# Patient Record
Sex: MALE | Race: WHITE | NOT HISPANIC OR LATINO | Employment: OTHER | URBAN - METROPOLITAN AREA
[De-identification: names, ages, dates, MRNs, and addresses within clinical notes are randomized per-mention and may not be internally consistent; named-entity substitution may affect disease eponyms.]

---

## 2017-08-16 ENCOUNTER — ALLSCRIPTS OFFICE VISIT (OUTPATIENT)
Dept: OTHER | Facility: OTHER | Age: 67
End: 2017-08-16

## 2017-08-16 ENCOUNTER — APPOINTMENT (OUTPATIENT)
Dept: LAB | Facility: HOSPITAL | Age: 67
End: 2017-08-16
Attending: INTERNAL MEDICINE

## 2017-08-16 DIAGNOSIS — E03.9 HYPOTHYROIDISM: ICD-10-CM

## 2017-08-16 LAB
T4 FREE SERPL-MCNC: 0.97 NG/DL (ref 0.76–1.46)
TSH SERPL DL<=0.05 MIU/L-ACNC: 3.61 UIU/ML (ref 0.36–3.74)

## 2017-08-16 PROCEDURE — 86800 THYROGLOBULIN ANTIBODY: CPT

## 2017-08-16 PROCEDURE — 36415 COLL VENOUS BLD VENIPUNCTURE: CPT

## 2017-08-16 PROCEDURE — 84443 ASSAY THYROID STIM HORMONE: CPT

## 2017-08-16 PROCEDURE — 86376 MICROSOMAL ANTIBODY EACH: CPT

## 2017-08-16 PROCEDURE — 84439 ASSAY OF FREE THYROXINE: CPT

## 2017-08-17 ENCOUNTER — GENERIC CONVERSION - ENCOUNTER (OUTPATIENT)
Dept: OTHER | Facility: OTHER | Age: 67
End: 2017-08-17

## 2017-08-17 LAB
THYROGLOB AB SERPL-ACNC: <1 IU/ML (ref 0–0.9)
THYROPEROXIDASE AB SERPL-ACNC: 109 IU/ML (ref 0–34)

## 2017-08-21 ENCOUNTER — GENERIC CONVERSION - ENCOUNTER (OUTPATIENT)
Dept: OTHER | Facility: OTHER | Age: 67
End: 2017-08-21

## 2017-09-29 DIAGNOSIS — E03.9 HYPOTHYROIDISM: ICD-10-CM

## 2018-01-10 NOTE — RESULT NOTES
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 Methodology    Performed at:  [facility] [address]  [id number]  : Jamee Knight MD, Phone:  [phone]   [facility]  IU/mL H 0 - 34   Performed at:  [facility] [address]  [id number]  : Jamee Knight MD, Phone:  [phone]

## 2018-01-12 NOTE — RESULT NOTES
Discussion/Summary   His hypothyroidism is due to autoimmunity       Verified Results  (1) THYROID ANTIBODIES PANEL [id number] 05:10PM [employer] Order Number: [id number]     Test Name Result Flag Reference   THY MICROSOM  IU/mL H 0 - 34   Performed at:  [facility] [place] [address]  [id number]  : Terence Freire MD, Phone:  [phone]

## 2018-01-14 VITALS
BODY MASS INDEX: 24.56 KG/M2 | HEART RATE: 80 BPM | SYSTOLIC BLOOD PRESSURE: 120 MMHG | DIASTOLIC BLOOD PRESSURE: 80 MMHG | WEIGHT: 156.5 LBS | HEIGHT: 67 IN

## 2018-01-15 NOTE — RESULT NOTES
Discussion/Summary   Increase levothyroxine to 50 Âµg per day  Check TSH and free T4 in 6 weeks  Verified Results  (1) TSH [id number] 05:10PM [employer] Order Number: [id number]     Test Name Result Flag Reference   TSH 3 610 uIU/mL  0 358-3 740   Patients undergoing fluorescein dye angiography may retain small amounts of fluorescein in the body for 48-72 hours post procedure  Samples containing fluorescein can produce falsely depressed TSH values  If the patient had this procedure,a specimen should be resubmitted post fluorescein clearance  (1) T4, FREE [id number] 05:10PM [employer] Order Number: [id number]     Test Name Result Flag Reference   T4,FREE 0 97 ng/dL  0 76-1 46   Specimen collection should occur prior to Sulfasalazine administration due to the potential for falsely elevated results

## 2018-05-08 DIAGNOSIS — E03.9 HYPOTHYROIDISM, UNSPECIFIED TYPE: Primary | ICD-10-CM

## 2018-05-08 RX ORDER — LEVOTHYROXINE SODIUM 0.05 MG/1
1 TABLET ORAL DAILY
COMMUNITY
Start: 2017-12-20 | End: 2018-05-08 | Stop reason: SDUPTHER

## 2018-05-09 RX ORDER — LEVOTHYROXINE SODIUM 0.05 MG/1
50 TABLET ORAL DAILY
Qty: 30 TABLET | Refills: 0 | Status: SHIPPED | OUTPATIENT
Start: 2018-05-09 | End: 2018-06-05 | Stop reason: SDUPTHER

## 2018-06-05 DIAGNOSIS — E03.9 HYPOTHYROIDISM, UNSPECIFIED TYPE: ICD-10-CM

## 2018-06-05 RX ORDER — LEVOTHYROXINE SODIUM 0.05 MG/1
TABLET ORAL
Qty: 30 TABLET | Refills: 0 | Status: SHIPPED | OUTPATIENT
Start: 2018-06-05 | End: 2018-07-05 | Stop reason: CLARIF

## 2018-07-03 DIAGNOSIS — E03.9 HYPOTHYROIDISM, UNSPECIFIED TYPE: ICD-10-CM

## 2018-07-03 RX ORDER — LEVOTHYROXINE SODIUM 0.05 MG/1
TABLET ORAL
Qty: 30 TABLET | Refills: 0 | Status: CANCELLED | OUTPATIENT
Start: 2018-07-03

## 2018-07-05 DIAGNOSIS — E03.9 HYPOTHYROIDISM, UNSPECIFIED TYPE: ICD-10-CM

## 2018-07-05 RX ORDER — LEVOTHYROXINE SODIUM 0.05 MG/1
50 TABLET ORAL DAILY
Qty: 30 TABLET | Refills: 6 | Status: SHIPPED | OUTPATIENT
Start: 2018-07-05 | End: 2019-04-03 | Stop reason: SDUPTHER

## 2019-04-03 ENCOUNTER — OFFICE VISIT (OUTPATIENT)
Dept: ENDOCRINOLOGY | Facility: CLINIC | Age: 69
End: 2019-04-03
Payer: MEDICARE

## 2019-04-03 ENCOUNTER — APPOINTMENT (OUTPATIENT)
Dept: LAB | Facility: CLINIC | Age: 69
End: 2019-04-03
Payer: MEDICARE

## 2019-04-03 VITALS
DIASTOLIC BLOOD PRESSURE: 76 MMHG | HEIGHT: 67 IN | HEART RATE: 75 BPM | SYSTOLIC BLOOD PRESSURE: 118 MMHG | BODY MASS INDEX: 25.21 KG/M2 | WEIGHT: 160.6 LBS

## 2019-04-03 DIAGNOSIS — E03.9 HYPOTHYROIDISM, ADULT: Primary | ICD-10-CM

## 2019-04-03 LAB
T4 FREE SERPL-MCNC: 1.1 NG/DL (ref 0.76–1.46)
TSH SERPL DL<=0.05 MIU/L-ACNC: 3.01 UIU/ML (ref 0.36–3.74)

## 2019-04-03 PROCEDURE — 84443 ASSAY THYROID STIM HORMONE: CPT | Performed by: NURSE PRACTITIONER

## 2019-04-03 PROCEDURE — 36415 COLL VENOUS BLD VENIPUNCTURE: CPT | Performed by: NURSE PRACTITIONER

## 2019-04-03 PROCEDURE — 84439 ASSAY OF FREE THYROXINE: CPT | Performed by: NURSE PRACTITIONER

## 2019-04-03 PROCEDURE — 99214 OFFICE O/P EST MOD 30 MIN: CPT | Performed by: NURSE PRACTITIONER

## 2019-04-03 RX ORDER — LEVOTHYROXINE SODIUM 0.05 MG/1
50 TABLET ORAL DAILY
Qty: 30 TABLET | Refills: 1 | Status: SHIPPED | OUTPATIENT
Start: 2019-04-03 | End: 2019-05-22 | Stop reason: SDUPTHER

## 2019-04-03 RX ORDER — ATORVASTATIN CALCIUM 20 MG/1
20 TABLET, FILM COATED ORAL DAILY
Refills: 0 | COMMUNITY
Start: 2019-02-14

## 2019-04-03 RX ORDER — MELATONIN
1000 DAILY
COMMUNITY

## 2019-04-04 ENCOUNTER — TELEPHONE (OUTPATIENT)
Dept: ENDOCRINOLOGY | Facility: CLINIC | Age: 69
End: 2019-04-04

## 2019-05-22 DIAGNOSIS — E03.9 HYPOTHYROIDISM, ADULT: ICD-10-CM

## 2019-05-22 RX ORDER — LEVOTHYROXINE SODIUM 0.05 MG/1
50 TABLET ORAL DAILY
Qty: 30 TABLET | Refills: 1 | Status: SHIPPED | OUTPATIENT
Start: 2019-05-22 | End: 2019-07-17 | Stop reason: SDUPTHER

## 2019-07-17 DIAGNOSIS — E03.9 HYPOTHYROIDISM, ADULT: ICD-10-CM

## 2019-07-17 RX ORDER — LEVOTHYROXINE SODIUM 0.05 MG/1
50 TABLET ORAL DAILY
Qty: 30 TABLET | Refills: 1 | Status: SHIPPED | OUTPATIENT
Start: 2019-07-17 | End: 2019-08-13 | Stop reason: SDUPTHER

## 2019-08-13 DIAGNOSIS — E03.9 HYPOTHYROIDISM, ADULT: ICD-10-CM

## 2019-08-13 RX ORDER — LEVOTHYROXINE SODIUM 0.05 MG/1
TABLET ORAL
Qty: 90 TABLET | Refills: 3 | Status: SHIPPED | OUTPATIENT
Start: 2019-08-13 | End: 2020-07-24

## 2019-09-10 ENCOUNTER — OFFICE VISIT (OUTPATIENT)
Dept: URGENT CARE | Facility: CLINIC | Age: 69
End: 2019-09-10
Payer: MEDICARE

## 2019-09-10 VITALS
DIASTOLIC BLOOD PRESSURE: 83 MMHG | WEIGHT: 158 LBS | HEIGHT: 67 IN | SYSTOLIC BLOOD PRESSURE: 137 MMHG | RESPIRATION RATE: 16 BRPM | HEART RATE: 89 BPM | BODY MASS INDEX: 24.8 KG/M2 | TEMPERATURE: 97.6 F

## 2019-09-10 DIAGNOSIS — S81.002A OPEN KNEE WOUND, LEFT, INITIAL ENCOUNTER: Primary | ICD-10-CM

## 2019-09-10 PROCEDURE — G0463 HOSPITAL OUTPT CLINIC VISIT: HCPCS | Performed by: PHYSICIAN ASSISTANT

## 2019-09-10 PROCEDURE — 99213 OFFICE O/P EST LOW 20 MIN: CPT | Performed by: PHYSICIAN ASSISTANT

## 2019-09-10 RX ORDER — CEPHALEXIN 500 MG/1
500 CAPSULE ORAL EVERY 6 HOURS SCHEDULED
Qty: 28 CAPSULE | Refills: 0 | Status: SHIPPED | OUTPATIENT
Start: 2019-09-10 | End: 2019-09-17

## 2019-09-10 NOTE — PATIENT INSTRUCTIONS
Prescription for Keflex sent to the pharmacy-use as directed  Keep area clean and dry  Apply triple antibiotic ointment twice daily  Closely monitor for signs of worsening infection  Bacitracin ointment and dressing applied in office  Proceed to  ER if symptoms worsen

## 2019-09-10 NOTE — PROGRESS NOTES
[facility]        NAME: Hal Van is a 77 y o  male  : 1950    MRN: [id number]  DATE: September 10, 2019  TIME: 6:20 PM    Assessment and Plan   Open knee wound, left, initial encounter [S81 002A]  1  Open knee wound, left, initial encounter  cephalexin (KEFLEX) 500 mg capsule         Patient Instructions   Prescription for Keflex sent to the pharmacy-use as directed  Keep area clean and dry  Apply triple antibiotic ointment twice daily  Closely monitor for signs of worsening infection  Bacitracin ointment and dressing applied in office  Proceed to  ER if symptoms worsen  Chief Complaint     Chief Complaint   Patient presents with    Fall     3 days ago  abrasion on L knee  Cleaned and applied abx cream  Now knee is swollen and painful  states of some drainage  History of Present Illness   The patient is a 64-year-old male who presents with a wound of his left knee after a fall that occurred 3 days ago  He has noticed increased redness and swelling from the wound  He also states that there has been some drainage but he is unsure of what color  Negative fever or chills  Negative bony tenderness  He also has a small abrasion on his right knee that is healing well  He has been putting a topical medication from [place] on the wound but is unable to tell me what type of medication it is  HPI    Review of Systems   Review of Systems   Constitutional: Negative for chills and fever  Musculoskeletal: Positive for arthralgias and joint swelling  Skin: Positive for wound (Left knee)  All other systems reviewed and are negative          Current Medications       Current Outpatient Medications:     atorvastatin (LIPITOR) 20 mg tablet, Take 20 mg by mouth daily, Disp: , Rfl: 0    cholecalciferol (VITAMIN D3) 1,000 units tablet, Take 1,000 Units by mouth daily, Disp: , Rfl:     levothyroxine 50 mcg tablet, Take 1 pill daily, Disp: 90 tablet, Rfl: 3    cephalexin (KEFLEX) 500 mg capsule, Take 1 capsule (500 mg total) by mouth every 6 (six) hours for 7 days, Disp: 28 capsule, Rfl: 0    Current Allergies     Allergies as of 09/10/2019    (No Known Allergies)            The following portions of the patient's history were reviewed and updated as appropriate: allergies, current medications, past family history, past medical history, past social history, past surgical history and problem list      No past medical history on file  No past surgical history on file  Family History   Problem Relation Age of Onset    Thyroid disease unspecified Paternal Grandfather          Medications have been verified  Objective   /83 (Patient Position: Sitting)   Pulse 89   Temp 97 6 °F (36 4 °C) (Temporal)   Resp 16   Ht 5' 7" (1 702 m)   Wt 71 7 kg (158 lb)   BMI 24 75 kg/m²        Physical Exam     Physical Exam   Constitutional: He appears well-developed and well-nourished  No distress  HENT:   Head: Normocephalic and atraumatic  Musculoskeletal:        Legs:  Skin: Skin is warm and dry  Capillary refill takes less than 2 seconds  No rash noted  He is not diaphoretic  No erythema  No pallor  Psychiatric: He has a normal mood and affect  His behavior is normal    Nursing note and vitals reviewed

## 2020-07-23 DIAGNOSIS — E03.9 HYPOTHYROIDISM, ADULT: ICD-10-CM

## 2020-07-24 RX ORDER — LEVOTHYROXINE SODIUM 0.05 MG/1
TABLET ORAL
Qty: 90 TABLET | Refills: 3 | Status: SHIPPED | OUTPATIENT
Start: 2020-07-24

## 2021-02-13 DIAGNOSIS — Z23 ENCOUNTER FOR IMMUNIZATION: ICD-10-CM

## 2021-02-17 ENCOUNTER — IMMUNIZATIONS (OUTPATIENT)
Dept: FAMILY MEDICINE CLINIC | Facility: HOSPITAL | Age: 71
End: 2021-02-17

## 2021-02-17 DIAGNOSIS — Z23 ENCOUNTER FOR IMMUNIZATION: Primary | ICD-10-CM

## 2021-02-17 PROCEDURE — 91300 SARS-COV-2 / COVID-19 MRNA VACCINE (PFIZER-BIONTECH) 30 MCG: CPT

## 2021-02-17 PROCEDURE — 0001A SARS-COV-2 / COVID-19 MRNA VACCINE (PFIZER-BIONTECH) 30 MCG: CPT

## 2021-03-09 ENCOUNTER — IMMUNIZATIONS (OUTPATIENT)
Dept: FAMILY MEDICINE CLINIC | Facility: HOSPITAL | Age: 71
End: 2021-03-09

## 2021-03-09 DIAGNOSIS — Z23 ENCOUNTER FOR IMMUNIZATION: Primary | ICD-10-CM

## 2021-03-09 PROCEDURE — 91300 SARS-COV-2 / COVID-19 MRNA VACCINE (PFIZER-BIONTECH) 30 MCG: CPT

## 2021-03-09 PROCEDURE — 0002A SARS-COV-2 / COVID-19 MRNA VACCINE (PFIZER-BIONTECH) 30 MCG: CPT

## 2021-09-14 ENCOUNTER — IMMUNIZATIONS (OUTPATIENT)
Dept: FAMILY MEDICINE CLINIC | Facility: HOSPITAL | Age: 71
End: 2021-09-14

## 2021-09-14 DIAGNOSIS — Z23 ENCOUNTER FOR IMMUNIZATION: Primary | ICD-10-CM

## 2021-09-14 PROCEDURE — 91300 SARS-COV-2 / COVID-19 MRNA VACCINE (PFIZER-BIONTECH) 30 MCG: CPT

## 2021-09-14 PROCEDURE — 0001A SARS-COV-2 / COVID-19 MRNA VACCINE (PFIZER-BIONTECH) 30 MCG: CPT

## 2023-01-25 ENCOUNTER — HOSPITAL ENCOUNTER (OUTPATIENT)
Dept: NON INVASIVE DIAGNOSTICS | Facility: HOSPITAL | Age: 73
Discharge: HOME | End: 2023-01-25

## 2023-01-25 VITALS
HEART RATE: 88 BPM | BODY MASS INDEX: 24.8 KG/M2 | OXYGEN SATURATION: 96 % | DIASTOLIC BLOOD PRESSURE: 70 MMHG | HEIGHT: 67 IN | WEIGHT: 158 LBS | SYSTOLIC BLOOD PRESSURE: 110 MMHG | RESPIRATION RATE: 16 BRPM

## 2023-01-25 DIAGNOSIS — E78.5 HYPERLIPIDEMIA, UNSPECIFIED HYPERLIPIDEMIA TYPE: ICD-10-CM

## 2023-01-25 LAB
CHEST PAIN STATEMENT: NORMAL
MAX DIASTOLIC BP: 80 MMHG
MAX HEART RATE: 122 BPM
MAX HR PERCENT: 81 %
MAX HR: 120 BPM
MAX PREDICTED HEART RATE: 149 BPM
MAX. SYSTOLIC BP: 150 MMHG
PROTOCOL NAME: NORMAL
RATE PRESSURE PRODUCT: NORMAL
REASON FOR TERMINATION: NORMAL
SL CV STRESS RECOVERY BP: NORMAL MMHG
SL CV STRESS RECOVERY HR: 93 BPM
SL CV STRESS RECOVERY O2 SAT: 97 %
SL CV STRESS STAGE REACHED: 2
STRESS ANGINA INDEX: 0
STRESS BASELINE BP: NORMAL MMHG
STRESS BASELINE HR: 88 BPM
STRESS DUKE TREADMILL SCORE: 6
STRESS O2 SAT REST: 96 %
STRESS PEAK HR: 122 BPM
STRESS POST ESTIMATED WORKLOAD: 7 METS
STRESS POST EXERCISE DUR MIN: 5 MIN
STRESS POST EXERCISE DUR SEC: 59 SEC
STRESS POST O2 SAT PEAK: 97 %
STRESS POST PEAK BP: 150 MMHG
STRESS ST DEPRESSION: 0 MM
TARGET HR FORMULA: NORMAL
TEST INDICATION: NORMAL
TIME IN EXERCISE PHASE: NORMAL
TR MAX PG: 27 MMHG
TR PEAK VELOCITY: 2.6 M/S
TRICUSPID VALVE PEAK REGURGITATION VELOCITY: 2.61 M/S

## 2023-03-27 ENCOUNTER — ANESTHESIA (OUTPATIENT)
Dept: ANESTHESIOLOGY | Facility: HOSPITAL | Age: 73
End: 2023-03-27

## 2023-03-27 ENCOUNTER — CONSULT (OUTPATIENT)
Dept: PULMONOLOGY | Facility: CLINIC | Age: 73
End: 2023-03-27

## 2023-03-27 ENCOUNTER — ANESTHESIA EVENT (OUTPATIENT)
Dept: ANESTHESIOLOGY | Facility: HOSPITAL | Age: 73
End: 2023-03-27

## 2023-03-27 VITALS
TEMPERATURE: 96.4 F | BODY MASS INDEX: 25.18 KG/M2 | WEIGHT: 160.4 LBS | HEIGHT: 67 IN | DIASTOLIC BLOOD PRESSURE: 80 MMHG | RESPIRATION RATE: 18 BRPM | OXYGEN SATURATION: 96 % | HEART RATE: 89 BPM | SYSTOLIC BLOOD PRESSURE: 114 MMHG

## 2023-03-27 DIAGNOSIS — R91.8 LUNG MASS: ICD-10-CM

## 2023-03-27 DIAGNOSIS — R04.2 HEMOPTYSIS: ICD-10-CM

## 2023-03-27 DIAGNOSIS — D38.1 NEOPLASM OF UNCERTAIN BEHAVIOR OF LEFT UPPER LOBE OF LUNG: Primary | ICD-10-CM

## 2023-03-27 PROBLEM — J45.909 ASTHMA: Status: ACTIVE | Noted: 2023-03-27

## 2023-03-27 PROBLEM — E78.49 OTHER HYPERLIPIDEMIA: Status: ACTIVE | Noted: 2023-03-27

## 2023-03-27 RX ORDER — LEVOTHYROXINE SODIUM 0.07 MG/1
75 TABLET ORAL DAILY
COMMUNITY
Start: 2023-02-27

## 2023-03-27 RX ORDER — OXYCODONE HYDROCHLORIDE 5 MG/1
5 TABLET ORAL
COMMUNITY
Start: 2023-03-20

## 2023-03-27 RX ORDER — OXYCODONE HYDROCHLORIDE 5 MG/1
TABLET ORAL
COMMUNITY
Start: 2023-02-03

## 2023-03-27 RX ORDER — ATORVASTATIN CALCIUM 20 MG/1
TABLET, FILM COATED ORAL
COMMUNITY

## 2023-03-27 RX ORDER — CEPHALEXIN 500 MG/1
500 CAPSULE ORAL 2 TIMES DAILY
COMMUNITY
Start: 2023-03-18 | End: 2023-03-31

## 2023-03-27 NOTE — PROGRESS NOTES
Pulmonary Outpatient Consultation Note   Mellissa Cartagena 71 y o  male MRN: [id number]  3/27/2023    Referring provider: Dr Yesenia Rowley     Assessment/Plan:      Lung mass  Patient presents with a 2-month history of hemoptysis and findings of a left upper lobe/hilar mass, concerning for bronchogenic carcinoma  I have reviewed imaging with the patient and his brother  In order to confirm the diagnosis, he will need a biopsy, followed by staging in the form of a PET scan  Because of the central location of the tumor, biopsy via bronchoscopy is most prudent  We have made arrangements for bronchoscopy with endobronchial ultrasound to be performed tomorrow at [address] [place] [address]  I suspect he will have endobronchial tumor involvement given reports of hemoptysis  In the event we are unable to secure a diagnosis with tomorrow's procedure, could always consider navigation bronchoscopy if needed following week  He will also undergo PFTs, hoping that this is an early stage process and surgery would be an option  Once we have all of the information, I will plan to present at our multidisciplinary tumor board to discuss next steps  All of his and his brother's questions were answered to their satisfaction    Visit orders:    Diagnoses and all orders for this visit:    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  -     NM PET CT skull base to mid thigh; Future  -     Complete PFT with post bronchodilator; Future    Lung mass    Hemoptysis    Other orders  -     atorvastatin (LIPITOR) 20 mg tablet; Take by mouth  -     cephalexin (KEFLEX) 500 mg capsule; Take 500 mg by mouth 2 (two) times a day  -     levothyroxine 75 mcg tablet; Take 75 mcg by mouth daily (Patient not taking: Reported on 3/27/2023)  -     oxyCODONE (ROXICODONE) 5 immediate release tablet;  Take 5 mg by mouth every 4 to 6 hours as needed for pain (Patient not taking: Reported on 3/27/2023)  -     oxyCODONE (ROXICODONE) 5 immediate release tablet; Take 1 tablet every 4-6 hours for severe post operative pain  Do not take this medication with any other narcotics  Do not drive or operate heavy machinery while taking this medication  Do not drink alcohol while taking this medication  (Patient not taking: Reported on 3/27/2023)      History of Present Illness   HPI:  Desiree Quevedo is a 71 y o  male who is here today for evaluation regarding left hilar mass  He noted hemoptysis approximately 2 months ago  He reports intermittent episodes, with most recent episode a few days ago  For these concerns, he underwent CT of the chest which showed a left hilar mass  He is here today to discuss next steps  He is accompanied by his brother  Although the patient does not sense being short of breath, his brother feels that he seems more breathless when they are speaking over the phone  He denies shortness of breath while sitting at rest   He had asthma as a child, but no lung problems into adulthood  He does not use inhalers  He denies wheezing  He complains of fatigue  He also notes some vocal hoarseness over the past few months  He underwent left foot surgery/bunion repair in the recent past and is still in a left foot boot  He has some lower extremity edema on the left but denies calf tenderness  Patient smoked up to a pack per day for 30 years and quit a few months ago  Weight is stable  He denies fevers, chills or sweats  Review of Systems   Constitutional: Positive for fatigue  Negative for chills, fever and unexpected weight change  HENT: Positive for voice change  Negative for postnasal drip and sore throat  Eyes: Negative for visual disturbance  Respiratory:        As noted in HPI   Cardiovascular: Negative for chest pain  Gastrointestinal: Negative for abdominal pain, diarrhea and vomiting  Musculoskeletal: Negative for arthralgias  Skin: Negative for rash  Neurological: Negative for headaches  Hematological: Negative for adenopathy  Psychiatric/Behavioral: Negative  All other systems reviewed and are negative  Historical Information   Past Medical History:   Diagnosis Date   • Hyperlipidemia    • Hypothyroid      Past Surgical History:   Procedure Laterality Date   • BUNIONECTOMY Left      Family History   Problem Relation Age of Onset   • Lung cancer Mother    • Thyroid disease unspecified Paternal Grandfather        Occupational History: Retired  Previously worked as a [employer] jobs    Social History     Tobacco Use   Smoking Status Former   • Packs/day: 1 00   • Years: 30 00   • Pack years: 30 00   • Types: Cigarettes   • Quit date: 2023   • Years since quittin 2   Smokeless Tobacco Current       Meds/Allergies     Current Outpatient Medications:   •  atorvastatin (LIPITOR) 20 mg tablet, Take by mouth, Disp: , Rfl:   •  cephalexin (KEFLEX) 500 mg capsule, Take 500 mg by mouth 2 (two) times a day, Disp: , Rfl:   •  cholecalciferol (VITAMIN D3) 1,000 units tablet, Take 1,000 Units by mouth daily, Disp: , Rfl:   •  levothyroxine 50 mcg tablet, TAKE 1 TABLET BY MOUTH EVERY DAY, Disp: 90 tablet, Rfl: 3  •  atorvastatin (LIPITOR) 20 mg tablet, Take 20 mg by mouth daily (Patient not taking: Reported on 3/27/2023), Disp: , Rfl: 0  •  levothyroxine 75 mcg tablet, Take 75 mcg by mouth daily (Patient not taking: Reported on 3/27/2023), Disp: , Rfl:   •  oxyCODONE (ROXICODONE) 5 immediate release tablet, Take 5 mg by mouth every 4 to 6 hours as needed for pain (Patient not taking: Reported on 3/27/2023), Disp: , Rfl:   •  oxyCODONE (ROXICODONE) 5 immediate release tablet, Take 1 tablet every 4-6 hours for severe post operative pain  Do not take this medication with any other narcotics  Do not drive or operate heavy machinery while taking this medication  Do not drink alcohol while taking this medication   (Patient not taking: Reported on 3/27/2023), Disp: , Rfl:   No Known "Allergies    Vitals: Blood pressure 114/80, pulse 89, temperature (!) 96 4 °F (35 8 °C), resp  rate 18, height 5' 7\" (1 702 m), weight 72 8 kg (160 lb 6 4 oz), SpO2 96 %  , Body mass index is 25 12 kg/m²  Oxygen Therapy  SpO2: 96 %  Oxygen Therapy: None (Room air)    Physical Exam   Physical Exam  Constitutional:       General: He is not in acute distress  HENT:      Head: Normocephalic  Mouth/Throat:      Pharynx: No oropharyngeal exudate  Eyes:      General: No scleral icterus  Neck:      Vascular: No JVD  Cardiovascular:      Rate and Rhythm: Normal rate and regular rhythm  Pulmonary:      Breath sounds: No wheezing, rhonchi or rales  Abdominal:      Palpations: Abdomen is soft  Tenderness: There is no abdominal tenderness  Musculoskeletal:      Cervical back: Neck supple  Left lower leg: Edema present  Comments: Left foot boot in place, no calf tenderness   Lymphadenopathy:      Cervical: No cervical adenopathy  Skin:     General: Skin is warm and dry  Neurological:      Mental Status: He is alert and oriented to person, place, and time  Psychiatric:         Mood and Affect: Mood normal        Imaging and other studies: I have personally reviewed pertinent reports  and I have personally reviewed pertinent films in PACS  Chest CT 3/21/23 - 3 3 x 2 5 cm MARISOL/hilar mass / LAD - Left hilum 9 mm, right hilum 1 4 cm, left paratracheal 9 mm    Labs performed on 1/27/2023 show normal CBC and platelet count    LFTs normal     "

## 2023-03-27 NOTE — ANESTHESIA PREPROCEDURE EVALUATION
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      Other   (+) Hemoptysis   (+) Lung mass      Exercise Stress 1/25/2023:  •  Stress ECG: The patient reached stage 2 0 of the protocol after exercising for 5 min and 59 sec and had a maximal HR of 120 bpm (81 % of MPHR) and 7 0 METS  The patient experienced no angina during the test   Test stopped due to limiting dyspnea and foot pain  •  Stress ECG: No ST deviation is noted  Arrhythmias during stress: occasional PVCs  The stress ECG is negative for ischemia after maximal exercise at 81% of a predicted maximal heart rate, without reproduction of symptoms  Strauss treadmill score is 6, low risk  •  Post Stress Echo: Left ventricle cavity has normal reduction in size post-stress  The left ventricle systolic function is normal post-stress  •  Echo Post Impression: No EKG or echo evidence of stress-induced ischemia at 81% of age-predicted maximal heart rate  Test sensitivity is slightly lowered due to failure to achieve target heart rate      Low risk exercise stress echo with no EKG or echo evidence of stress-induced ischemia at 81% of age-predicted maximal heart rate  No results found for: WBC, HGB, HCT, MCV, PLT  No results found for: SODIUM, K, CL, CO2, BUN, CREATININE, GLUC, CALCIUM  No results found for: INR, PROTIME  Lab Results   Component Value Date    HGBA1C 6 1 (H) 01/27/2023               Anesthesia Plan  ASA Score- 3     Anesthesia Type- general with ASA Monitors  Additional Monitors:   Airway Plan: LMA  Plan Factors-    Chart reviewed  EKG reviewed  Existing labs reviewed  Patient summary reviewed  Induction- intravenous      Postoperative Plan-     Informed Consent-

## 2023-03-27 NOTE — ASSESSMENT & PLAN NOTE
Patient presents with a 2-month history of hemoptysis and findings of a left upper lobe/hilar mass, concerning for bronchogenic carcinoma  I have reviewed imaging with the patient and his brother  In order to confirm the diagnosis, he will need a biopsy, followed by staging in the form of a PET scan  Because of the central location of the tumor, biopsy via bronchoscopy is most prudent  We have made arrangements for bronchoscopy with endobronchial ultrasound to be performed tomorrow at [address] [place] [address]  I suspect he will have endobronchial tumor involvement given reports of hemoptysis  In the event we are unable to secure a diagnosis with tomorrow's procedure, could always consider navigation bronchoscopy if needed following week  He will also undergo PFTs, hoping that this is an early stage process and surgery would be an option  Once we have all of the information, I will plan to present at our multidisciplinary tumor board to discuss next steps

## 2023-03-27 NOTE — LETTER
March 27, 2023     Crystal Da Silva DO  [address] [address]    Patient: Shantal Pompa   YOB: 1950   Date of Visit: 3/27/2023       Dear Dr Mihir Beckford: Thank you for referring Alfa Coombs to me for evaluation  Below are my notes for this consultation  If you have questions, please do not hesitate to call me  I look forward to following your patient along with you  Sincerely,        Bernice Rondon DO        CC: No Recipients  Bernice Rondon DO  3/27/2023  9:57 AM  Addendum  Pulmonary Outpatient Consultation Note   Shantal Pompa 71 y o  male MRN: [id number]  3/27/2023    Referring provider: Dr Luis Doherty     Assessment/Plan:      Lung mass  Patient presents with a 2-month history of hemoptysis and findings of a left upper lobe/hilar mass, concerning for bronchogenic carcinoma  I have reviewed imaging with the patient and his brother  In order to confirm the diagnosis, he will need a biopsy, followed by staging in the form of a PET scan  Because of the central location of the tumor, biopsy via bronchoscopy is most prudent  We have made arrangements for bronchoscopy with endobronchial ultrasound to be performed tomorrow at [address] [place] [address]  I suspect he will have endobronchial tumor involvement given reports of hemoptysis  In the event we are unable to secure a diagnosis with tomorrow's procedure, could always consider navigation bronchoscopy if needed following week  He will also undergo PFTs, hoping that this is an early stage process and surgery would be an option  Once we have all of the information, I will plan to present at our multidisciplinary tumor board to discuss next steps  All of his and his brother's questions were answered to their satisfaction    Visit orders:    Diagnoses and all orders for this visit:    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  -     NM PET CT skull base to mid thigh;  Future  - Complete PFT with post bronchodilator; Future    Lung mass    Hemoptysis    Other orders  -     atorvastatin (LIPITOR) 20 mg tablet; Take by mouth  -     cephalexin (KEFLEX) 500 mg capsule; Take 500 mg by mouth 2 (two) times a day  -     levothyroxine 75 mcg tablet; Take 75 mcg by mouth daily (Patient not taking: Reported on 3/27/2023)  -     oxyCODONE (ROXICODONE) 5 immediate release tablet; Take 5 mg by mouth every 4 to 6 hours as needed for pain (Patient not taking: Reported on 3/27/2023)  -     oxyCODONE (ROXICODONE) 5 immediate release tablet; Take 1 tablet every 4-6 hours for severe post operative pain  Do not take this medication with any other narcotics  Do not drive or operate heavy machinery while taking this medication  Do not drink alcohol while taking this medication  (Patient not taking: Reported on 3/27/2023)      History of Present Illness   HPI:  Bharti Rich is a 71 y o  male who is here today for evaluation regarding left hilar mass  He noted hemoptysis approximately 2 months ago  He reports intermittent episodes, with most recent episode a few days ago  For these concerns, he underwent CT of the chest which showed a left hilar mass  He is here today to discuss next steps  He is accompanied by his brother  Although the patient does not sense being short of breath, his brother feels that he seems more breathless when they are speaking over the phone  He denies shortness of breath while sitting at rest   He had asthma as a child, but no lung problems into adulthood  He does not use inhalers  He denies wheezing  He complains of fatigue  He also notes some vocal hoarseness over the past few months  He underwent left foot surgery/bunion repair in the recent past and is still in a left foot boot  He has some lower extremity edema on the left but denies calf tenderness  Patient smoked up to a pack per day for 30 years and quit a few months ago  Weight is stable    He denies fevers, chills or sweats  Review of Systems   Constitutional: Positive for fatigue  Negative for chills, fever and unexpected weight change  HENT: Positive for voice change  Negative for postnasal drip and sore throat  Eyes: Negative for visual disturbance  Respiratory:        As noted in HPI   Cardiovascular: Negative for chest pain  Gastrointestinal: Negative for abdominal pain, diarrhea and vomiting  Musculoskeletal: Negative for arthralgias  Skin: Negative for rash  Neurological: Negative for headaches  Hematological: Negative for adenopathy  Psychiatric/Behavioral: Negative  All other systems reviewed and are negative  Historical Information   Past Medical History:   Diagnosis Date   • Hyperlipidemia    • Hypothyroid      Past Surgical History:   Procedure Laterality Date   • BUNIONECTOMY Left      Family History   Problem Relation Age of Onset   • Lung cancer Mother    • Thyroid disease unspecified Paternal Grandfather        Occupational History: Retired    Previously worked as a [employer] and [employer] jobs    Social History     Tobacco Use   Smoking Status Former   • Packs/day:    • Years:    • Pack years:    • Types: Cigarettes   • Quit date: 2023   • Years since quittin 2   Smokeless Tobacco Current       Meds/Allergies      Current Outpatient Medications:   •  atorvastatin (LIPITOR) 20 mg tablet, Take by mouth, Disp: , Rfl:   •  cephalexin (KEFLEX) 500 mg capsule, Take 500 mg by mouth 2 (two) times a day, Disp: , Rfl:   •  cholecalciferol (VITAMIN D3) 1,000 units tablet, Take 1,000 Units by mouth daily, Disp: , Rfl:   •  levothyroxine 50 mcg tablet, TAKE 1 TABLET BY MOUTH EVERY DAY, Disp: 90 tablet, Rfl: 3  •  atorvastatin (LIPITOR) 20 mg tablet, Take 20 mg by mouth daily (Patient not taking: Reported on 3/27/2023), Disp: , Rfl: 0  •  levothyroxine 75 mcg tablet, Take 75 mcg by mouth daily (Patient not taking: Reported on 3/27/2023), Disp: , Rfl:   • "oxyCODONE (ROXICODONE) 5 immediate release tablet, Take 5 mg by mouth every 4 to 6 hours as needed for pain (Patient not taking: Reported on 3/27/2023), Disp: , Rfl:   •  oxyCODONE (ROXICODONE) 5 immediate release tablet, Take 1 tablet every 4-6 hours for severe post operative pain  Do not take this medication with any other narcotics  Do not drive or operate heavy machinery while taking this medication  Do not drink alcohol while taking this medication  (Patient not taking: Reported on 3/27/2023), Disp: , Rfl:   No Known Allergies    Vitals: Blood pressure 114/80, pulse 89, temperature (!) 96 4 °F (35 8 °C), resp  rate 18, height 5' 7\" (1 702 m), weight 72 8 kg (160 lb 6 4 oz), SpO2 96 %  , Body mass index is 25 12 kg/m²  Oxygen Therapy  SpO2: 96 %  Oxygen Therapy: None (Room air)    Physical Exam   Physical Exam  Constitutional:       General: He is not in acute distress  HENT:      Head: Normocephalic  Mouth/Throat:      Pharynx: No oropharyngeal exudate  Eyes:      General: No scleral icterus  Neck:      Vascular: No JVD  Cardiovascular:      Rate and Rhythm: Normal rate and regular rhythm  Abdominal:      Palpations: Abdomen is soft  Tenderness: There is no abdominal tenderness  Musculoskeletal:      Cervical back: Neck supple  Left lower leg: Edema present  Comments: Left foot boot in place, no calf tenderness   Lymphadenopathy:      Cervical: No cervical adenopathy  Skin:     General: Skin is warm and dry  Neurological:      Mental Status: He is alert and oriented to person, place, and time  Psychiatric:         Mood and Affect: Mood normal        Imaging and other studies: I have personally reviewed pertinent reports  and I have personally reviewed pertinent films in PACS  Chest CT 3/21/23 - 3 3 x 2 5 cm MARISOL/hilar mass / LAD - Left hilum 9 mm, right hilum 1 4 cm, left paratracheal 9 mm    Labs performed on 1/27/2023 show normal CBC and platelet count    LFTs " normal

## 2023-03-27 NOTE — H&P (VIEW-ONLY)
Pulmonary Outpatient Consultation Note   Iraida Bennett 71 y o  male MRN: [id number]  3/27/2023    Referring provider: Dr Grecia Del Valle     Assessment/Plan:      Lung mass  Patient presents with a 2-month history of hemoptysis and findings of a left upper lobe/hilar mass, concerning for bronchogenic carcinoma  I have reviewed imaging with the patient and his brother  In order to confirm the diagnosis, he will need a biopsy, followed by staging in the form of a PET scan  Because of the central location of the tumor, biopsy via bronchoscopy is most prudent  We have made arrangements for bronchoscopy with endobronchial ultrasound to be performed tomorrow at [address] [place] [address]  I suspect he will have endobronchial tumor involvement given reports of hemoptysis  In the event we are unable to secure a diagnosis with tomorrow's procedure, could always consider navigation bronchoscopy if needed following week  He will also undergo PFTs, hoping that this is an early stage process and surgery would be an option  Once we have all of the information, I will plan to present at our multidisciplinary tumor board to discuss next steps  All of his and his brother's questions were answered to their satisfaction    Visit orders:    Diagnoses and all orders for this visit:    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  -     NM PET CT skull base to mid thigh; Future  -     Complete PFT with post bronchodilator; Future    Lung mass    Hemoptysis    Other orders  -     atorvastatin (LIPITOR) 20 mg tablet; Take by mouth  -     cephalexin (KEFLEX) 500 mg capsule; Take 500 mg by mouth 2 (two) times a day  -     levothyroxine 75 mcg tablet; Take 75 mcg by mouth daily (Patient not taking: Reported on 3/27/2023)  -     oxyCODONE (ROXICODONE) 5 immediate release tablet;  Take 5 mg by mouth every 4 to 6 hours as needed for pain (Patient not taking: Reported on 3/27/2023)  -     oxyCODONE (ROXICODONE) 5 immediate release tablet; Take 1 tablet every 4-6 hours for severe post operative pain  Do not take this medication with any other narcotics  Do not drive or operate heavy machinery while taking this medication  Do not drink alcohol while taking this medication  (Patient not taking: Reported on 3/27/2023)      History of Present Illness   HPI:  Marli Hernandez is a 71 y o  male who is here today for evaluation regarding left hilar mass  He noted hemoptysis approximately 2 months ago  He reports intermittent episodes, with most recent episode a few days ago  For these concerns, he underwent CT of the chest which showed a left hilar mass  He is here today to discuss next steps  He is accompanied by his brother  Although the patient does not sense being short of breath, his brother feels that he seems more breathless when they are speaking over the phone  He denies shortness of breath while sitting at rest   He had asthma as a child, but no lung problems into adulthood  He does not use inhalers  He denies wheezing  He complains of fatigue  He also notes some vocal hoarseness over the past few months  He underwent left foot surgery/bunion repair in the recent past and is still in a left foot boot  He has some lower extremity edema on the left but denies calf tenderness  Patient smoked up to a pack per day for 30 years and quit a few months ago  Weight is stable  He denies fevers, chills or sweats  Review of Systems   Constitutional: Positive for fatigue  Negative for chills, fever and unexpected weight change  HENT: Positive for voice change  Negative for postnasal drip and sore throat  Eyes: Negative for visual disturbance  Respiratory:        As noted in HPI   Cardiovascular: Negative for chest pain  Gastrointestinal: Negative for abdominal pain, diarrhea and vomiting  Musculoskeletal: Negative for arthralgias  Skin: Negative for rash  Neurological: Negative for headaches  Hematological: Negative for adenopathy  Psychiatric/Behavioral: Negative  All other systems reviewed and are negative  Historical Information   Past Medical History:   Diagnosis Date   • Hyperlipidemia    • Hypothyroid      Past Surgical History:   Procedure Laterality Date   • BUNIONECTOMY Left      Family History   Problem Relation Age of Onset   • Lung cancer Mother    • Thyroid disease unspecified Paternal Grandfather        Occupational History: Retired  Previously worked as a [employer] and [employer] jobs    Social History     Tobacco Use   Smoking Status Former   • Packs/day: 1 00   • Years: 30 00   • Pack years: 30 00   • Types: Cigarettes   • Quit date: 2023   • Years since quittin 2   Smokeless Tobacco Current       Meds/Allergies     Current Outpatient Medications:   •  atorvastatin (LIPITOR) 20 mg tablet, Take by mouth, Disp: , Rfl:   •  cephalexin (KEFLEX) 500 mg capsule, Take 500 mg by mouth 2 (two) times a day, Disp: , Rfl:   •  cholecalciferol (VITAMIN D3) 1,000 units tablet, Take 1,000 Units by mouth daily, Disp: , Rfl:   •  levothyroxine 50 mcg tablet, TAKE 1 TABLET BY MOUTH EVERY DAY, Disp: 90 tablet, Rfl: 3  •  atorvastatin (LIPITOR) 20 mg tablet, Take 20 mg by mouth daily (Patient not taking: Reported on 3/27/2023), Disp: , Rfl: 0  •  levothyroxine 75 mcg tablet, Take 75 mcg by mouth daily (Patient not taking: Reported on 3/27/2023), Disp: , Rfl:   •  oxyCODONE (ROXICODONE) 5 immediate release tablet, Take 5 mg by mouth every 4 to 6 hours as needed for pain (Patient not taking: Reported on 3/27/2023), Disp: , Rfl:   •  oxyCODONE (ROXICODONE) 5 immediate release tablet, Take 1 tablet every 4-6 hours for severe post operative pain  Do not take this medication with any other narcotics  Do not drive or operate heavy machinery while taking this medication  Do not drink alcohol while taking this medication   (Patient not taking: Reported on 3/27/2023), Disp: , Rfl:   No Known "Allergies    Vitals: Blood pressure 114/80, pulse 89, temperature (!) 96 4 °F (35 8 °C), resp  rate 18, height 5' 7\" (1 702 m), weight 72 8 kg (160 lb 6 4 oz), SpO2 96 %  , Body mass index is 25 12 kg/m²  Oxygen Therapy  SpO2: 96 %  Oxygen Therapy: None (Room air)    Physical Exam   Physical Exam  Constitutional:       General: He is not in acute distress  HENT:      Head: Normocephalic  Mouth/Throat:      Pharynx: No oropharyngeal exudate  Eyes:      General: No scleral icterus  Neck:      Vascular: No JVD  Cardiovascular:      Rate and Rhythm: Normal rate and regular rhythm  Pulmonary:      Breath sounds: No wheezing, rhonchi or rales  Abdominal:      Palpations: Abdomen is soft  Tenderness: There is no abdominal tenderness  Musculoskeletal:      Cervical back: Neck supple  Left lower leg: Edema present  Comments: Left foot boot in place, no calf tenderness   Lymphadenopathy:      Cervical: No cervical adenopathy  Skin:     General: Skin is warm and dry  Neurological:      Mental Status: He is alert and oriented to person, place, and time  Psychiatric:         Mood and Affect: Mood normal        Imaging and other studies: I have personally reviewed pertinent reports  and I have personally reviewed pertinent films in PACS  Chest CT 3/21/23 - 3 3 x 2 5 cm MARISOL/hilar mass / LAD - Left hilum 9 mm, right hilum 1 4 cm, left paratracheal 9 mm    Labs performed on 1/27/2023 show normal CBC and platelet count    LFTs normal     "

## 2023-03-28 ENCOUNTER — PREP FOR PROCEDURE (OUTPATIENT)
Dept: PULMONOLOGY | Facility: CLINIC | Age: 73
End: 2023-03-28

## 2023-03-28 ENCOUNTER — HOSPITAL ENCOUNTER (OUTPATIENT)
Dept: GASTROENTEROLOGY | Facility: HOSPITAL | Age: 73
Setting detail: OUTPATIENT SURGERY
Discharge: HOME | End: 2023-03-28
Attending: INTERNAL MEDICINE

## 2023-03-28 ENCOUNTER — ANESTHESIA EVENT (OUTPATIENT)
Dept: GASTROENTEROLOGY | Facility: HOSPITAL | Age: 73
End: 2023-03-28

## 2023-03-28 ENCOUNTER — ANESTHESIA (OUTPATIENT)
Dept: GASTROENTEROLOGY | Facility: HOSPITAL | Age: 73
End: 2023-03-28

## 2023-03-28 VITALS
RESPIRATION RATE: 18 BRPM | HEIGHT: 67 IN | DIASTOLIC BLOOD PRESSURE: 62 MMHG | OXYGEN SATURATION: 94 % | HEART RATE: 82 BPM | TEMPERATURE: 97 F | SYSTOLIC BLOOD PRESSURE: 110 MMHG | WEIGHT: 160 LBS | BODY MASS INDEX: 25.11 KG/M2

## 2023-03-28 DIAGNOSIS — R91.8 LUNG MASS: Primary | ICD-10-CM

## 2023-03-28 DIAGNOSIS — R91.8 LUNG MASS: ICD-10-CM

## 2023-03-28 LAB
EOSINOPHIL NFR FLD MANUAL: 2 %
LYMPHOCYTES NFR BLD AUTO: 15 %
NEUTS SEG NFR BLD AUTO: 83 %
TOTAL CELLS COUNTED SPEC: 100

## 2023-03-28 RX ORDER — PROPOFOL 10 MG/ML
INJECTION, EMULSION INTRAVENOUS CONTINUOUS PRN
Status: DISCONTINUED | OUTPATIENT
Start: 2023-03-28 | End: 2023-03-28

## 2023-03-28 RX ORDER — EPHEDRINE SULFATE 50 MG/ML
INJECTION INTRAVENOUS AS NEEDED
Status: DISCONTINUED | OUTPATIENT
Start: 2023-03-28 | End: 2023-03-28

## 2023-03-28 RX ORDER — LIDOCAINE HYDROCHLORIDE 20 MG/ML
INJECTION, SOLUTION EPIDURAL; INFILTRATION; INTRACAUDAL; PERINEURAL AS NEEDED
Status: DISCONTINUED | OUTPATIENT
Start: 2023-03-28 | End: 2023-03-28

## 2023-03-28 RX ORDER — FENTANYL CITRATE/PF 50 MCG/ML
25 SYRINGE (ML) INJECTION
Status: DISCONTINUED | OUTPATIENT
Start: 2023-03-28 | End: 2023-03-28 | Stop reason: HOSPADM

## 2023-03-28 RX ORDER — PHENYLEPHRINE HYDROCHLORIDE 10 MG/ML
INJECTION INTRAVENOUS AS NEEDED
Status: DISCONTINUED | OUTPATIENT
Start: 2023-03-28 | End: 2023-03-28

## 2023-03-28 RX ORDER — DEXAMETHASONE SODIUM PHOSPHATE 10 MG/ML
INJECTION, SOLUTION INTRAMUSCULAR; INTRAVENOUS AS NEEDED
Status: DISCONTINUED | OUTPATIENT
Start: 2023-03-28 | End: 2023-03-28

## 2023-03-28 RX ORDER — PROPOFOL 10 MG/ML
INJECTION, EMULSION INTRAVENOUS AS NEEDED
Status: DISCONTINUED | OUTPATIENT
Start: 2023-03-28 | End: 2023-03-28

## 2023-03-28 RX ORDER — FENTANYL CITRATE 50 UG/ML
INJECTION, SOLUTION INTRAMUSCULAR; INTRAVENOUS AS NEEDED
Status: DISCONTINUED | OUTPATIENT
Start: 2023-03-28 | End: 2023-03-28

## 2023-03-28 RX ORDER — MIDAZOLAM HYDROCHLORIDE 2 MG/2ML
INJECTION, SOLUTION INTRAMUSCULAR; INTRAVENOUS AS NEEDED
Status: DISCONTINUED | OUTPATIENT
Start: 2023-03-28 | End: 2023-03-28

## 2023-03-28 RX ORDER — ALBUTEROL SULFATE 2.5 MG/3ML
2.5 SOLUTION RESPIRATORY (INHALATION) ONCE
Status: CANCELLED | OUTPATIENT
Start: 2023-03-28

## 2023-03-28 RX ORDER — SODIUM CHLORIDE, SODIUM LACTATE, POTASSIUM CHLORIDE, CALCIUM CHLORIDE 600; 310; 30; 20 MG/100ML; MG/100ML; MG/100ML; MG/100ML
INJECTION, SOLUTION INTRAVENOUS CONTINUOUS PRN
Status: DISCONTINUED | OUTPATIENT
Start: 2023-03-28 | End: 2023-03-28

## 2023-03-28 RX ADMIN — PROPOFOL 150 MG: 10 INJECTION, EMULSION INTRAVENOUS at 07:38

## 2023-03-28 RX ADMIN — EPHEDRINE SULFATE 10 MG: 50 INJECTION, SOLUTION INTRAVENOUS at 08:49

## 2023-03-28 RX ADMIN — LIDOCAINE HYDROCHLORIDE 80 MG: 20 INJECTION, SOLUTION EPIDURAL; INFILTRATION; INTRACAUDAL; PERINEURAL at 07:38

## 2023-03-28 RX ADMIN — SODIUM CHLORIDE, SODIUM LACTATE, POTASSIUM CHLORIDE, AND CALCIUM CHLORIDE: .6; .31; .03; .02 INJECTION, SOLUTION INTRAVENOUS at 07:34

## 2023-03-28 RX ADMIN — MIDAZOLAM 2 MG: 1 INJECTION INTRAMUSCULAR; INTRAVENOUS at 07:34

## 2023-03-28 RX ADMIN — PHENYLEPHRINE HYDROCHLORIDE 200 MCG: 10 INJECTION INTRAVENOUS at 08:36

## 2023-03-28 RX ADMIN — PHENYLEPHRINE HYDROCHLORIDE 200 MCG: 10 INJECTION INTRAVENOUS at 08:29

## 2023-03-28 RX ADMIN — PHENYLEPHRINE HYDROCHLORIDE 200 MCG: 10 INJECTION INTRAVENOUS at 08:22

## 2023-03-28 RX ADMIN — PHENYLEPHRINE HYDROCHLORIDE 200 MCG: 10 INJECTION INTRAVENOUS at 08:49

## 2023-03-28 RX ADMIN — DEXAMETHASONE SODIUM PHOSPHATE 10 MG: 10 INJECTION, SOLUTION INTRAMUSCULAR; INTRAVENOUS at 07:38

## 2023-03-28 RX ADMIN — EPHEDRINE SULFATE 10 MG: 50 INJECTION, SOLUTION INTRAVENOUS at 08:07

## 2023-03-28 RX ADMIN — FENTANYL CITRATE 50 MCG: 50 INJECTION INTRAMUSCULAR; INTRAVENOUS at 07:34

## 2023-03-28 RX ADMIN — PHENYLEPHRINE HYDROCHLORIDE 200 MCG: 10 INJECTION INTRAVENOUS at 07:48

## 2023-03-28 RX ADMIN — REMIFENTANIL HYDROCHLORIDE 0.02 MCG/KG/MIN: 1 INJECTION, POWDER, LYOPHILIZED, FOR SOLUTION INTRAVENOUS at 07:41

## 2023-03-28 RX ADMIN — PHENYLEPHRINE HYDROCHLORIDE 200 MCG: 10 INJECTION INTRAVENOUS at 07:56

## 2023-03-28 RX ADMIN — EPHEDRINE SULFATE 10 MG: 50 INJECTION, SOLUTION INTRAVENOUS at 07:58

## 2023-03-28 RX ADMIN — PHENYLEPHRINE HYDROCHLORIDE 200 MCG: 10 INJECTION INTRAVENOUS at 08:16

## 2023-03-28 RX ADMIN — FENTANYL CITRATE 50 MCG: 50 INJECTION INTRAMUSCULAR; INTRAVENOUS at 07:45

## 2023-03-28 RX ADMIN — SODIUM CHLORIDE, SODIUM LACTATE, POTASSIUM CHLORIDE, AND CALCIUM CHLORIDE: .6; .31; .03; .02 INJECTION, SOLUTION INTRAVENOUS at 08:54

## 2023-03-28 RX ADMIN — PHENYLEPHRINE HYDROCHLORIDE 200 MCG: 10 INJECTION INTRAVENOUS at 07:44

## 2023-03-28 RX ADMIN — PROPOFOL 100 MCG/KG/MIN: 10 INJECTION, EMULSION INTRAVENOUS at 07:43

## 2023-03-28 RX ADMIN — PHENYLEPHRINE HYDROCHLORIDE 200 MCG: 10 INJECTION INTRAVENOUS at 08:08

## 2023-03-28 NOTE — RESPIRATORY THERAPY NOTE
Assisted Ebus procedure with Dr Stevo Madden  Anesthesia present for airway and sedation  3ml of 2% lido given via neb pre-procedure  8 ml of 1% lido injected on cords, gentry, right and left main  2- 21 gauge needles used for samples for lab which was present  2 brushes for cytology 4 passes  For tissue sampling and 120 ml of 0 9% saline injected in lingula for BAL  30 ml returned  All samples ordered and sent to lab   Nl issues throughout procesdure

## 2023-03-28 NOTE — INTERVAL H&P NOTE
H&P reviewed  After examining the patient I find no changes in the patients condition since the H&P had been written  Patient has been symptomatically stable since evaluation in clinic yesterday  No hemoptysis since then  He has been having intermittent hemoptysis for about the last 2 months  He quit smoking a few months ago, but has about a 30 pack year history  Still suffering from mild fatigue and reports a mild change in his voice  His brother believes he is more short of breath than his prior normal recently  ROS otherwise normal      Denies allergy to latex  Has been NPO since yesterday evening  Does not take a blood thinner  Vitals:    03/28/23 0712   BP: 138/89   Pulse: 89   Resp: 20   Temp: 97 9 °F (36 6 °C)   SpO2: 98%         Physical Exam:  General: Well appearing, NAD  Neuro: No gross focal deficit or weakness  Card: RRR, no murmurs, gallops or rubs  Pulm: CTA bilaterally without wheezes, rales or rhonchi  Abd: Soft, nontender, nondistended  MSK: Mild LLE edema  No calf tenderness  Cap refill < 2 seconds     Assessment:  Prior intermittently hemoptysis, likely due to left hilar mass seen on imaging from outside hospital 3/20/23  Lesion concerning for malignancy  Plan: Will proceed with bronchoscopy with EBUS and biopsies as planned for this morning

## 2023-03-28 NOTE — ANESTHESIA PREPROCEDURE EVALUATION
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      Other   (+) Hemoptysis   (+) Lung mass      Exercise Stress 1/25/2023:  •  Stress ECG: The patient reached stage 2 0 of the protocol after exercising for 5 min and 59 sec and had a maximal HR of 120 bpm (81 % of MPHR) and 7 0 METS  The patient experienced no angina during the test   Test stopped due to limiting dyspnea and foot pain  •  Stress ECG: No ST deviation is noted  Arrhythmias during stress: occasional PVCs  The stress ECG is negative for ischemia after maximal exercise at 81% of a predicted maximal heart rate, without reproduction of symptoms  Strauss treadmill score is 6, low risk  •  Post Stress Echo: Left ventricle cavity has normal reduction in size post-stress  The left ventricle systolic function is normal post-stress  •  Echo Post Impression: No EKG or echo evidence of stress-induced ischemia at 81% of age-predicted maximal heart rate  Test sensitivity is slightly lowered due to failure to achieve target heart rate      Low risk exercise stress echo with no EKG or echo evidence of stress-induced ischemia at 81% of age-predicted maximal heart rate      No results found for: WBC, HGB, HCT, MCV, PLT  No results found for: SODIUM, K, CL, CO2, BUN, CREATININE, GLUC, CALCIUM  No results found for: INR, PROTIME  Lab Results   Component Value Date    HGBA1C 6 1 (H) 01/27/2023          Physical Exam    Airway    Mallampati score: II  TM Distance: >3 FB  Neck ROM: full     Dental   No notable dental hx     Cardiovascular  Cardiovascular exam normal    Pulmonary  Pulmonary exam normal     Other Findings        Anesthesia Plan  ASA Score- 3     Anesthesia Type- general with ASA Monitors  Additional Monitors:   Airway Plan: LMA  Comment: Discussed with patient plan for anesthesia, as well as risks/benefits, including likely chance of PONV and sore throat, as well as rare possibilities of dental/oropharyngeal/ocular injuries, aspiration, and severe/life-threatening surgical and anesthetic emergencies  Patient expressed understanding and agreement          Plan Factors-Exercise tolerance (METS): >4 METS  Chart reviewed  EKG reviewed  Existing labs reviewed  Patient summary reviewed  Induction- intravenous  Postoperative Plan-     Informed Consent- Anesthetic plan and risks discussed with patient  I personally reviewed this patient with the CRNA  Discussed and agreed on the Anesthesia Plan with the CRNA  Swetha Blank

## 2023-03-28 NOTE — ANESTHESIA POSTPROCEDURE EVALUATION
Post-Op Assessment Note    CV Status:  Stable  Pain Score: 0    Pain management: adequate     Mental Status:  Awake and alert   Hydration Status:  Stable   PONV Controlled:  None   Airway Patency:  Patent and adequate      Post Op Vitals Reviewed: Yes      Staff: CRNA, Anesthesiologist         No notable events documented      BP   136/74   Temp   97 4   Pulse  90   Resp   22   SpO2   97%

## 2023-03-29 ENCOUNTER — HOSPITAL ENCOUNTER (INPATIENT)
Facility: HOSPITAL | Age: 73
LOS: 1 days | Discharge: HOME | End: 2023-03-31
Attending: EMERGENCY MEDICINE | Admitting: INTERNAL MEDICINE

## 2023-03-29 ENCOUNTER — TELEPHONE (OUTPATIENT)
Dept: PULMONOLOGY | Facility: CLINIC | Age: 73
End: 2023-03-29

## 2023-03-29 ENCOUNTER — APPOINTMENT (EMERGENCY)
Dept: CT IMAGING | Facility: HOSPITAL | Age: 73
End: 2023-03-29

## 2023-03-29 ENCOUNTER — APPOINTMENT (EMERGENCY)
Dept: RADIOLOGY | Facility: HOSPITAL | Age: 73
End: 2023-03-29

## 2023-03-29 DIAGNOSIS — J98.2 PNEUMOMEDIASTINUM (HCC): Primary | ICD-10-CM

## 2023-03-29 LAB
ALBUMIN SERPL BCP-MCNC: 4.2 G/DL (ref 3.5–5)
ALP SERPL-CCNC: 75 U/L (ref 34–104)
ALT SERPL W P-5'-P-CCNC: 14 U/L (ref 7–52)
ANION GAP SERPL CALCULATED.3IONS-SCNC: 7 MMOL/L (ref 4–13)
AST SERPL W P-5'-P-CCNC: 18 U/L (ref 13–39)
BASOPHILS # BLD AUTO: 0.08 THOUSANDS/ÂΜL (ref 0–0.1)
BASOPHILS NFR BLD AUTO: 1 % (ref 0–1)
BILIRUB SERPL-MCNC: 0.38 MG/DL (ref 0.2–1)
BUN SERPL-MCNC: 17 MG/DL (ref 5–25)
CALCIUM SERPL-MCNC: 9.6 MG/DL (ref 8.4–10.2)
CHLORIDE SERPL-SCNC: 104 MMOL/L (ref 96–108)
CO2 SERPL-SCNC: 28 MMOL/L (ref 21–32)
CREAT SERPL-MCNC: 1.04 MG/DL (ref 0.6–1.3)
EOSINOPHIL # BLD AUTO: 0.2 THOUSAND/ÂΜL (ref 0–0.61)
EOSINOPHIL NFR BLD AUTO: 1 % (ref 0–6)
ERYTHROCYTE [DISTWIDTH] IN BLOOD BY AUTOMATED COUNT: 13.3 % (ref 11.6–15.1)
GFR SERPL CREATININE-BSD FRML MDRD: 71 ML/MIN/1.73SQ M
GLUCOSE SERPL-MCNC: 93 MG/DL (ref 65–140)
HCT VFR BLD AUTO: 40.3 % (ref 36.5–49.3)
HGB BLD-MCNC: 13.4 G/DL (ref 12–17)
IMM GRANULOCYTES # BLD AUTO: 0.07 THOUSAND/UL (ref 0–0.2)
IMM GRANULOCYTES NFR BLD AUTO: 0 % (ref 0–2)
LYMPHOCYTES # BLD AUTO: 3.36 THOUSANDS/ÂΜL (ref 0.6–4.47)
LYMPHOCYTES NFR BLD AUTO: 20 % (ref 14–44)
MCH RBC QN AUTO: 32.4 PG (ref 26.8–34.3)
MCHC RBC AUTO-ENTMCNC: 33.3 G/DL (ref 31.4–37.4)
MCV RBC AUTO: 97 FL (ref 82–98)
MONOCYTES # BLD AUTO: 1.22 THOUSAND/ÂΜL (ref 0.17–1.22)
MONOCYTES NFR BLD AUTO: 7 % (ref 4–12)
NEUTROPHILS # BLD AUTO: 11.56 THOUSANDS/ÂΜL (ref 1.85–7.62)
NEUTS SEG NFR BLD AUTO: 71 % (ref 43–75)
NRBC BLD AUTO-RTO: 0 /100 WBCS
PLATELET # BLD AUTO: 345 THOUSANDS/UL (ref 149–390)
PMV BLD AUTO: 9.2 FL (ref 8.9–12.7)
POTASSIUM SERPL-SCNC: 4.2 MMOL/L (ref 3.5–5.3)
PROT SERPL-MCNC: 6.8 G/DL (ref 6.4–8.4)
RBC # BLD AUTO: 4.14 MILLION/UL (ref 3.88–5.62)
SODIUM SERPL-SCNC: 139 MMOL/L (ref 135–147)
WBC # BLD AUTO: 16.49 THOUSAND/UL (ref 4.31–10.16)

## 2023-03-29 RX ORDER — IPRATROPIUM BROMIDE AND ALBUTEROL SULFATE 2.5; .5 MG/3ML; MG/3ML
3 SOLUTION RESPIRATORY (INHALATION) ONCE
Status: COMPLETED | OUTPATIENT
Start: 2023-03-29 | End: 2023-03-29

## 2023-03-29 RX ORDER — BENZONATATE 100 MG/1
100 CAPSULE ORAL ONCE
Status: COMPLETED | OUTPATIENT
Start: 2023-03-29 | End: 2023-03-29

## 2023-03-29 RX ADMIN — IOHEXOL 85 ML: 350 INJECTION, SOLUTION INTRAVENOUS at 21:47

## 2023-03-29 RX ADMIN — IPRATROPIUM BROMIDE AND ALBUTEROL SULFATE 3 ML: .5; 3 SOLUTION RESPIRATORY (INHALATION) at 22:25

## 2023-03-29 RX ADMIN — BENZONATATE 100 MG: 100 CAPSULE ORAL at 22:25

## 2023-03-29 NOTE — TELEPHONE ENCOUNTER
"[facility], he stated he got a call from one of the nurses to check on him today and he was advised to contact his doctor and go to the ER  He completed his EBUS yesterday with Dr Short  His face and neck has swelled up significantly, he said \" I look like Marcial Lobe as the [place]  \" He cannot lay down without coughing excessively and has a hard time swallowing  He wasn't sure what to do, if he should go to the ER or if we advise anything else   Please advise  "

## 2023-03-30 PROBLEM — D72.829 LEUKOCYTOSIS: Status: ACTIVE | Noted: 2023-03-30

## 2023-03-30 PROBLEM — J98.2 PNEUMOMEDIASTINUM (HCC): Status: ACTIVE | Noted: 2023-03-30

## 2023-03-30 LAB
ANION GAP SERPL CALCULATED.3IONS-SCNC: 8 MMOL/L (ref 4–13)
BUN SERPL-MCNC: 14 MG/DL (ref 5–25)
CALCIUM SERPL-MCNC: 9 MG/DL (ref 8.4–10.2)
CHLORIDE SERPL-SCNC: 104 MMOL/L (ref 96–108)
CO2 SERPL-SCNC: 26 MMOL/L (ref 21–32)
CREAT SERPL-MCNC: 0.86 MG/DL (ref 0.6–1.3)
ERYTHROCYTE [DISTWIDTH] IN BLOOD BY AUTOMATED COUNT: 13.5 % (ref 11.6–15.1)
GFR SERPL CREATININE-BSD FRML MDRD: 87 ML/MIN/1.73SQ M
GLUCOSE P FAST SERPL-MCNC: 81 MG/DL (ref 65–99)
GLUCOSE SERPL-MCNC: 81 MG/DL (ref 65–140)
HCT VFR BLD AUTO: 37.5 % (ref 36.5–49.3)
HGB BLD-MCNC: 12.5 G/DL (ref 12–17)
MCH RBC QN AUTO: 32.8 PG (ref 26.8–34.3)
MCHC RBC AUTO-ENTMCNC: 33.3 G/DL (ref 31.4–37.4)
MCV RBC AUTO: 98 FL (ref 82–98)
PLATELET # BLD AUTO: 295 THOUSANDS/UL (ref 149–390)
PMV BLD AUTO: 9.1 FL (ref 8.9–12.7)
POTASSIUM SERPL-SCNC: 3.8 MMOL/L (ref 3.5–5.3)
RBC # BLD AUTO: 3.81 MILLION/UL (ref 3.88–5.62)
SODIUM SERPL-SCNC: 138 MMOL/L (ref 135–147)
WBC # BLD AUTO: 11.12 THOUSAND/UL (ref 4.31–10.16)

## 2023-03-30 RX ORDER — HEPARIN SODIUM 5000 [USP'U]/ML
5000 INJECTION, SOLUTION INTRAVENOUS; SUBCUTANEOUS EVERY 8 HOURS SCHEDULED
Status: DISCONTINUED | OUTPATIENT
Start: 2023-03-31 | End: 2023-03-31 | Stop reason: HOSPADM

## 2023-03-30 RX ORDER — MELATONIN
1000 DAILY
Status: DISCONTINUED | OUTPATIENT
Start: 2023-03-30 | End: 2023-03-31 | Stop reason: HOSPADM

## 2023-03-30 RX ORDER — LANOLIN ALCOHOL/MO/W.PET/CERES
3 CREAM (GRAM) TOPICAL
Status: DISCONTINUED | OUTPATIENT
Start: 2023-03-30 | End: 2023-03-31 | Stop reason: HOSPADM

## 2023-03-30 RX ORDER — NICOTINE 21 MG/24HR
1 PATCH, TRANSDERMAL 24 HOURS TRANSDERMAL DAILY
Status: DISCONTINUED | OUTPATIENT
Start: 2023-03-30 | End: 2023-03-31 | Stop reason: HOSPADM

## 2023-03-30 RX ORDER — OXYCODONE HYDROCHLORIDE 5 MG/1
5 TABLET ORAL EVERY 4 HOURS PRN
Status: DISCONTINUED | OUTPATIENT
Start: 2023-03-30 | End: 2023-03-31 | Stop reason: HOSPADM

## 2023-03-30 RX ORDER — OXYCODONE HYDROCHLORIDE 5 MG/1
5 TABLET ORAL ONCE
Status: COMPLETED | OUTPATIENT
Start: 2023-03-30 | End: 2023-03-30

## 2023-03-30 RX ORDER — BENZONATATE 100 MG/1
100 CAPSULE ORAL 3 TIMES DAILY PRN
Status: DISCONTINUED | OUTPATIENT
Start: 2023-03-30 | End: 2023-03-31 | Stop reason: HOSPADM

## 2023-03-30 RX ORDER — ATORVASTATIN CALCIUM 20 MG/1
20 TABLET, FILM COATED ORAL
Status: DISCONTINUED | OUTPATIENT
Start: 2023-03-30 | End: 2023-03-31 | Stop reason: HOSPADM

## 2023-03-30 RX ORDER — HYDROMORPHONE HCL IN WATER/PF 6 MG/30 ML
0.2 PATIENT CONTROLLED ANALGESIA SYRINGE INTRAVENOUS EVERY 4 HOURS PRN
Status: DISCONTINUED | OUTPATIENT
Start: 2023-03-30 | End: 2023-03-31 | Stop reason: HOSPADM

## 2023-03-30 RX ORDER — LEVOTHYROXINE SODIUM 0.07 MG/1
75 TABLET ORAL
Status: DISCONTINUED | OUTPATIENT
Start: 2023-03-30 | End: 2023-03-31 | Stop reason: HOSPADM

## 2023-03-30 RX ADMIN — LEVOTHYROXINE SODIUM 75 MCG: 75 TABLET ORAL at 08:53

## 2023-03-30 RX ADMIN — HYDROMORPHONE HYDROCHLORIDE 0.2 MG: 0.2 INJECTION, SOLUTION INTRAMUSCULAR; INTRAVENOUS; SUBCUTANEOUS at 01:40

## 2023-03-30 RX ADMIN — BENZONATATE 100 MG: 100 CAPSULE ORAL at 09:51

## 2023-03-30 RX ADMIN — ATORVASTATIN CALCIUM 20 MG: 20 TABLET, FILM COATED ORAL at 17:18

## 2023-03-30 RX ADMIN — Medication 1000 UNITS: at 20:40

## 2023-03-30 RX ADMIN — OXYCODONE HYDROCHLORIDE 5 MG: 5 TABLET ORAL at 13:53

## 2023-03-30 NOTE — PLAN OF CARE
Problem: PAIN - ADULT  Goal: Verbalizes/displays adequate comfort level or baseline comfort level  Description: Interventions:  - Encourage patient to monitor pain and request assistance  - Assess pain using appropriate pain scale  - Administer analgesics based on type and severity of pain and evaluate response  - Implement non-pharmacological measures as appropriate and evaluate response  - Consider cultural and social influences on pain and pain management  - Notify physician/advanced practitioner if interventions unsuccessful or patient reports new pain  Outcome: Progressing     Problem: INFECTION - ADULT  Goal: Absence or prevention of progression during hospitalization  Description: INTERVENTIONS:  - Assess and monitor for signs and symptoms of infection  - Monitor lab/diagnostic results  - Monitor all insertion sites, i e  indwelling lines, tubes, and drains  - Monitor endotracheal if appropriate and nasal secretions for changes in amount and color  - [place] appropriate cooling/warming therapies per order  - Administer medications as ordered  - Instruct and encourage patient and family to use good hand hygiene technique  - Identify and instruct in appropriate isolation precautions for identified infection/condition  Outcome: Progressing  Goal: Absence of fever/infection during neutropenic period  Description: INTERVENTIONS:  - Monitor WBC    Outcome: Progressing     Problem: SAFETY ADULT  Goal: Patient will remain free of falls  Description: INTERVENTIONS:  - Educate patient/family on patient safety including physical limitations  - Instruct patient to call for assistance with activity   - Consult OT/PT to assist with strengthening/mobility   - Keep Call bell within reach  - Keep bed low and locked with side rails adjusted as appropriate  - Keep care items and personal belongings within reach  - Initiate and maintain comfort rounds  - Make Fall Risk Sign visible to staff  - Offer Toileting every 2 Hours, in advance of need  - Initiate/Maintain alarm  - Obtain necessary fall risk management equipment:   - Apply yellow socks and bracelet for high fall risk patients  - Consider moving patient to room near nurses station  Outcome: Progressing  Goal: Maintain or return to baseline ADL function  Description: INTERVENTIONS:  -  Assess patient's ability to carry out ADLs; assess patient's baseline for ADL function and identify physical deficits which impact ability to perform ADLs (bathing, care of mouth/teeth, toileting, grooming, dressing, etc )  - Assess/evaluate cause of self-care deficits   - Assess range of motion  - Assess patient's mobility; develop plan if impaired  - Assess patient's need for assistive devices and provide as appropriate  - Encourage maximum independence but intervene and supervise when necessary  - Involve family in performance of ADLs  - Assess for home care needs following discharge   - Consider OT consult to assist with ADL evaluation and planning for discharge  - Provide patient education as appropriate  Outcome: Progressing  Goal: Maintains/Returns to pre admission functional level  Description: INTERVENTIONS:  - Perform BMAT or MOVE assessment daily    - Set and communicate daily mobility goal to care team and patient/family/caregiver  - Collaborate with rehabilitation services on mobility goals if consulted  - Perform Range of Motion 3 times a day  - Reposition patient every 2 hours    - Dangle patient 3 times a day  - Stand patient 3 times a day  - Ambulate patient 3 times a day  - Out of bed to chair 3 times a day   - Out of bed for meals 3 times a day  - Out of bed for toileting  - Record patient progress and toleration of activity level   Outcome: Progressing     Problem: DISCHARGE PLANNING  Goal: Discharge to home or other facility with appropriate resources  Description: INTERVENTIONS:  - Identify barriers to discharge w/patient and caregiver  - Arrange for needed discharge resources and transportation as appropriate  - Identify discharge learning needs (meds, wound care, etc )  - Arrange for interpretive services to assist at discharge as needed  - Refer to Case Management Department for coordinating discharge planning if the patient needs post-hospital services based on physician/advanced practitioner order or complex needs related to functional status, cognitive ability, or social support system  Outcome: Progressing     Problem: Knowledge Deficit  Goal: Patient/family/caregiver demonstrates understanding of disease process, treatment plan, medications, and discharge instructions  Description: Complete learning assessment and assess knowledge base    Interventions:  - Provide teaching at level of understanding  - Provide teaching via preferred learning methods  Outcome: Progressing

## 2023-03-30 NOTE — CASE MANAGEMENT
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 2023   • Other hyperlipidemia 2023   • Hemoptysis 2023   • Asthma 2023   • Hypothyroidism, adult 2017      LOS (days): 0  Geometric Mean LOS (GMLOS) (days):   Days to GMLOS:     OBJECTIVE:              Current admission status: Observation       Preferred Pharmacy:   [facility]/pharmacy [address] [facility]  [address] , [place] - [address] [facility]  [id number] [place] [address]  [address]  Phone: [phone] Fax: [phone]    Primary Care Provider: Kevin Weber DO    Primary Insurance: MEDICARE  Secondary Insurance: AARP    ASSESSMENT:  Active Health Care Proxies    There are no active Health Care Proxies on file  Patient Information  Admitted from[de-identified] Home  Mental Status: Alert  During Assessment patient was accompanied by: Not accompanied during assessment  Assessment information provided by[de-identified] Patient  Primary Caregiver: Self  Support Systems: Family members  [place] of Residence: [address] [place] [address] do you live in?: [place] entry access options   Select all that apply : Stairs  Number of steps to enter home : 3  Type of Current Residence: 3 story home  Upon entering residence, is there a bedroom on the main floor (no further steps)?: No  A bedroom is located on the following floor levels of residence (select all that apply):: 2nd Floor  Upon entering residence, is there a bathroom on the main floor (no further steps)?: No  Indicate which floors of current residence have a bathroom (select all the apply):: 2nd Floor  Number of steps to 2nd floor from main floor: One Flight  In the last 12 months, was there a time when you were not able to pay the mortgage or rent on time?: No  In the last 12 months, was there a time when you did not have a steady place to sleep or slept in a shelter (including now)?: No  Homeless/housing insecurity resource given?: N/A  Living Arrangements: Lives Alone    Activities of Daily Living Prior to Admission  Functional Status: Independent  Completes ADLs independently?: Yes  Ambulates independently?: Yes  Does patient use assisted devices?: Yes  Assisted Devices (DME) used: Straight Cane, Shower Chair  Does patient currently own DME?: Yes  What DME does the patient currently own?: Straight Cane, Shower Chair, Walker  Does patient have a history of Outpatient Therapy (PT/OT)?: Yes  Does the patient have a history of Short-Term Rehab?: No  Does patient have a history of HHC?: No  Does patient currently have [address]?: No         Patient Information Continued  Within the past 12 months, you worried that your food would run out before you got the money to buy more : Never true  Within the past 12 months, the food you bought just didn't last and you didn't have money to get more : Never true  Food insecurity resource given?: N/A         Means of Transportation  Means of Transport to Appts[de-identified] Drives Self  In the past 12 months, has lack of transportation kept you from medical appointments or from getting medications?: No  In the past 12 months, has lack of transportation kept you from meetings, work, or from getting things needed for daily living?: No  Was application for public transport provided?: N/A    CM met with pt at bedside re: assessment  Pt lives alone in three story home, 3 [address] back entry, second floor bed/bath  Pt independent with ADLS, currently utilizing his cane for balance need, has hx of OPPT, and drives self to appointments   Pt relayed his car is currently in hospital ED parking lot and will drive himself home upon d/c but has friend/family to provide transport if needed  Confirmed PCP and preferred pharmacy  No current CM d/c needs identified, CM remains available

## 2023-03-30 NOTE — PROGRESS NOTES
Pulmonary Consultant note    Pt with MARISOL mass POD #1 bronchoscopy with EBUS sampling of level 7 LN and MARISOL mass as well as cytobrush MARISOL mass and endobronchial biopsies    Called and reported he was having facial swelling and advised by office staff to report to ED  Called patient, he reported he was in ED triage at [facility]  Met patient in ED 20 at [facility]    VS reviewed and HD stable  He reported mild dysphagia, no chest pain, no pleurisy, no added dyspnea  Exam with pt nontoxic, conversant, mild lower facial swelling, tongue midline  Neck - trachea midline, noted crepitus of neck and anterior chest  CV reg, single s1/2, no m/r  Pulm clear b/l, no wheeze or rales  Ext warm, no evidence of cyanosis    Assessment  • Facial swelling and crepitus  • Concern for pneumomediastinum, possible PTX post bronchoscopy with EBUS guided tissues sampling  • MARISOL lung mass concerning for malignancy  • Nicotine dependence in remission    Recommendations  • Discussed with ED resident and nursing supervisor current concerns  • Will obtain NC CT neck and chest now - I will await results  • Check basic labs - CBC and BMP  • Will at a minimum need observation overnight  • Further recommendations once CT images are obtained    Maylin Norris DO

## 2023-03-30 NOTE — ASSESSMENT & PLAN NOTE
"· 2 month history of hemoptysis  · MARISOL/hilar mass concerning for bronchogenic carcinoma which prompted bronchoscopy with EBUS to be performed on 03/28/20233  · CT chest on admission: \"No change in lingular mass compared to CT of 3/20/2023  Ill-defined splenic lesions, for which metastatic disease is not excluded  \"  · Continue follow up with Pulmonology     "

## 2023-03-30 NOTE — ED CARE HANDOFF
Emergency Department Sign Out Note        Sign out and transfer of care from Dr Judith Cushing  See Separate Emergency Department note  The patient, Ermelinda Vyas, was evaluated by the previous provider for neck discomfort  PMH of lung cancer s/p sampling via bronchoscopy  Neck pain today following procedure  Diffuse crepitus in neck and chest wall on exam     Workup Completed:  Dr Park Rocha came to evaluate patient at the bedside  Chest x-ray orderd and showed air in shoft tissues in neck and chest wall and pneumomediastinum  CT ordered for evaluation of the chest   Lab work conducted and only notable for a WBC count  Vitals stable throughout ED stay  Albuterol administered for wheezing  ED Course / Workup Pending (followup):  Ct chest without contrast has been completed for this patient  The scan is currently pending official read  Anticipated disposition for this patient is the patient should be admitted for observation after official read by radiology has been conducted for this individual                                       Dewey Maki is a patient who is status post bronchoscopy with sampling of lung mass for further evaluation and presents today with crepitus on examination and imaging findings consistent with pneumomediastinum as well as air in the subcutaneous tissues of the chest wall  CT imaging was completed and read by radiology as consistent with this after mentioned pathology and the case was discussed with the admitting inpatient medical team with regards to further management of this patient's care with anticipated follow-up by pulmonology the following morning after observation  This case was also rediscussed with the on-call pulmonology provider with regards to the appropriateness of admission for this patient    It was reiterated that the patient should be admitted to the hospital for observation for the pulmonology team to reassess in the morning  Inpatient medical team was agreeable with this plan and the patient was brought in underneath an observation stay with anticipation for pulmonology to see the following morning  Patient remained well with no vitals abnormalities while during my evaluation in the emergency department  Orders for observation stay were placed  [facility]):     Details: Patient has after mentioned pathology after bronchoscopy exam for sampling of lung mass  Patient to be continually monitored and evaluated in the hospital   Amount and/or Complexity of Data Reviewed  Labs: ordered  Details: Laboratory evaluation notable for an elevation in white count  Remaining laboratory evaluation grossly unremarkable  Radiology: ordered and independent interpretation performed  Details: CT and chest x-ray consistent with subcutaneous air as well as pneumomediastinum  Risk  Prescription drug management  Decision regarding hospitalization  Disposition  Final diagnoses:   Pneumomediastinum ([address] )     Time reflects when diagnosis was documented in both MDM as applicable and the Disposition within this note     Time User Action Codes Description Comment    3/30/2023 12:03 AM Kailyn Gutierrez Add [J98 2] Pneumomediastinum [facility])       ED Disposition     ED Disposition   Admit    Condition   Stable    Date/Time   Thu Mar 30, 2023 12:03 AM    Comment   Case was discussed with overnight admitting team and the patient's admission status was agreed to be Admission Status: observation status to the service of Dr Ángel Carreno   Follow-up Information    None       Patient's Medications   Discharge Prescriptions    No medications on file     No discharge procedures on file         ED Provider  Electronically Signed by     Clement Corbett MD  03/30/23 8850

## 2023-03-30 NOTE — ASSESSMENT & PLAN NOTE
"· Presentation: Patient presented to the ED due complaints of facial swelling, mild dysphagia and voice change that started last evening  Patient is POD #2 of bronchoscopy with EBUS sampling of level 7 LN and MARISOL mass as well as cybrush MARISOL mass and endobronchial biopsies  · CT chest: \"Extensive pneumomediastinum with extension into the neck and upper dorsal soft tissues  \"  · Strict NPO  · Keep HOB > 45°   · Monitor respiratory status closely  Currently SpO2 96-99%  · VS q4h  · Pulmonology consult    "

## 2023-03-30 NOTE — ASSESSMENT & PLAN NOTE
"· Presentation: Patient presented to the ED due complaints of facial swelling, mild dysphagia and voice change that started last evening  Patient is POD #2 of bronchoscopy with EBUS sampling of level 7 LN and MARISOL mass as well as cybrush MARISOL mass and endobronchial biopsies  · Likely secondary to bronchoscopy   · CT chest: \"Extensive pneumomediastinum with extension into the neck and upper dorsal soft tissues  \"  · Patient saturating well on room air  · Pulmonology following  · No need for further intervention at this time  · Repeat chest x-ray tomorrow morning; cleared for discharge per pulmonology  · Discharge with close pulmonology follow up  Delay PFTs 2-3 weeks and may require repeat bronch if prior tissue samples non-diagnostic for malignancy     "

## 2023-03-30 NOTE — CONSULTS
Consult Note - Pulmonary   Ifeanyi Cormier 71 y o  male MRN: [id number]  Unit/Bed#: W -35 Encounter: [id number]      Reason for consultation: Pneumomediastinum     Requesting physician: Talisha Quijano    Assessment & Recommendations:   1  Pneumomediastinum post bronchoscopy with EBUS tissue sampling on 3/28  · Continue supportive care and monitoring  · No need for further interventions at this time  · No fluid collections, afebrile and WBC downtrending - monitor off abx at this time  · Repeat CXR in AM and if stable/improving hopeful for discharge    2  Mild dysphagia   · Secondary to #1  · Check speech/swallow evaluation then ADAT    3  MARISOL mass lesion concerning for bronchogenic malignancy  · Await further biopsy results  · Initially with plans for AILEEN bronchoscopy next week if initial biopsies were non-diagnostic but this will need to be delayed due to pneumomediastinum  · Will need to likely delay PFT testing for another 4 weeks    4  Nicotine dependence in remission - remain tobacco free    I tiger texted Dr Erick Pisano at time of evaluation      History of Present Illness   HPI:  Ifeanyi Cormier is a 71 y o  male who has hypothyroidism, hyperlipidemia, former smoker, prior left foot surgery, and recently discovered MARISOL mass lesion  He underwent bronchoscopy with EBUS guided tissues sampling of level 7LN and left hilar mass on 3/28  He called office yesterday for facial swelling and was advised to go to ED  My exam in ED was concerning for pneumomediastinum and this was confirmed on imaging  He was admitted overnight for further monitoring  He reports feeling stable  Has continued mild dysphagia, facial swelling and neck swelling overall stable  He denied hemoptysis, no chest pain, no pleurisy, no wheeze  He denied vision changes, no fevers or chills  He denied abdominal pain  He has mild intermittent NP cough       Review of systems:  12 point review of systems was completed and was otherwise negative "except as listed in HPI  Historical Information   Past Medical History:   Diagnosis Date   • Hyperlipidemia    • Hypothyroid    • Mass of left lung      Past Surgical History:   Procedure Laterality Date   • BUNIONECTOMY Left    • COLONOSCOPY     • HERNIA REPAIR     • TONSILLECTOMY       Family History   Problem Relation Age of Onset   • Lung cancer Mother    • Cancer Father    • Thyroid disease unspecified Paternal Grandfather        Occupational History: retired, , government jobs    Social History: former smoker, quit few months ago, was 1ppd x 30 years    Meds/Allergies   Current Facility-Administered Medications   Medication Dose Route Frequency   • [START ON 3/31/2023] heparin (porcine) subcutaneous injection 5,000 Units  5,000 Units Subcutaneous Q8H [facility] & halfway   • HYDROmorphone HCl (DILAUDID) injection 0 2 mg  0 2 mg Intravenous Q4H PRN   • nicotine (NICODERM CQ) 14 mg/24hr TD 24 hr patch 1 patch  1 patch Transdermal Daily     Medications Prior to Admission   Medication   • atorvastatin (LIPITOR) 20 mg tablet   • cholecalciferol (VITAMIN D3) 1,000 units tablet   • levothyroxine 75 mcg tablet   • cephalexin (KEFLEX) 500 mg capsule   • oxyCODONE (ROXICODONE) 5 immediate release tablet   • oxyCODONE (ROXICODONE) 5 immediate release tablet     No Known Allergies    Vitals: Blood pressure 129/70, pulse 87, temperature 97 7 °F (36 5 °C), resp  rate 15, height 5' 7\" (1 702 m), weight 74 9 kg (165 lb 2 oz), SpO2 96 % , RA, Body mass index is 25 86 kg/m²      No intake or output data in the 24 hours ending 03/30/23 0813    Physical Exam  General: Older man in bed, awake alert and oriented x 3, conversant without conversational dyspnea, NAD, normal affect  HEENT:  PERRL, Sclera noninjected, nonicteric OU, Nares patent, no nasal flaring, no nasal drainage, Mucous membranes moist, no oral lesions, normal dentition, no facial crepitus  NECK: Trachea midline, no accessory muscle use, no stridor, no cervical or " supraclavicular adenopathy, JVP not elevated, stable degree of submandibular and neck crepitus  CARDIAC: Reg, single s1/S2, no m/r/g  PULM: mild anterior chest crepitus, lungs clear, no wheeze, no rales, no rhonchi  CHEST: No gross deformities, equal chest expansion on inspiration bilaterally  ABD: Normoactive bowel sounds, soft nontender, nondistended, no rebound, no rigidity, no guarding  EXT: No cyanosis, no clubbing, no edema, normal capillary refill, LLE in boot  SKIN:  No rashes, no lesions  NEURO: no focal neurologic deficits, AAOx3, moving all extremities appropriately    Labs: I have personally reviewed pertinent lab results  Laboratory and Diagnostics  Results from last 7 days   Lab Units 03/30/23  0617 03/29/23  2040   WBC Thousand/uL 11 12* 16 49*   HEMOGLOBIN g/dL 12 5 13 4   HEMATOCRIT % 37 5 40 3   PLATELETS Thousands/uL 295 345   NEUTROS PCT %  --  71   MONOS PCT %  --  7     Results from last 7 days   Lab Units 03/30/23  0617 03/29/23  2040   SODIUM mmol/L 138 139   POTASSIUM mmol/L 3 8 4 2   CHLORIDE mmol/L 104 104   CO2 mmol/L 26 28   ANION GAP mmol/L 8 7   BUN mg/dL 14 17   CREATININE mg/dL 0 86 1 04   CALCIUM mg/dL 9 0 9 6   GLUCOSE RANDOM mg/dL 81 93   ALT U/L  --  14   AST U/L  --  18   ALK PHOS U/L  --  75   ALBUMIN g/dL  --  4 2   TOTAL BILIRUBIN mg/dL  --  0 38     MARISOL BAL - 83% N, 15% L, cytology pending    Pending Diagnostic Samples: 3/28/2023  Level 7 LN - cytology, flow cytometry  Left hilar mass - cytology, flow cytometry  BAL lingula - cytology  Cytobrush lingula - cytology  Bronchial washing left lung - cytology  Endobronchial biopsy inferior lingula mucosa - histology    Imaging and other studies: I have personally reviewed pertinent reports     and I have personally reviewed pertinent films in PACS  CT Chest 3/29 - extensive pneumomediastinum with extension into neck, no evidence of PTX, no mediastinal fluid collections, no pleural effusions, stable MARISOL mass lesion    Pulmonary "function testing: none available     EKG, Pathology, and Other Studies: I have personally reviewed pertinent reports  Echo Stress 1/2023 \"Low risk exercise stress echo with no EKG or echo evidence of stress-induced ischemia at 81% of age-predicted maximal heart rate  \"      Code Status: Level 1 - Full Code      Bhargav Short DO, FACP  [facility]    "

## 2023-03-30 NOTE — H&P
"[facility]  H&P  Name: Ifeanyi Cormier I  MRN: [id number]  Unit/Bed#: W -01 I Date of Admission: 3/29/2023   Date of Service: 3/30/2023 I Hospital Day: 0      Assessment/Plan   * Pneumomediastinum [facility])  Assessment & Plan  · Presentation: Patient presented to the ED due complaints of facial swelling, mild dysphagia and voice change that started last evening  Patient is POD #2 of bronchoscopy with EBUS sampling of level 7 LN and MARISOL mass as well as cybrush MARISOL mass and endobronchial biopsies  · CT chest: \"Extensive pneumomediastinum with extension into the neck and upper dorsal soft tissues  \"  · Strict NPO  · Keep HOB > 45°   · Monitor respiratory status closely  Currently SpO2 96-99%  · VS q4h  · Pulmonology consult  Lung mass  Assessment & Plan  · 2 month history of hemoptysis  · MARISOL/hilar mass concerning for bronchogenic carcinoma which prompted bronchoscopy with EBUS to be performed on 03/28/20233  · CT chest on admission: \"No change in lingular mass compared to CT of 3/20/2023  Ill-defined splenic lesions, for which metastatic disease is not excluded  \"  · Continue follow up with Pulmonology  Leukocytosis  Assessment & Plan  · POA, WBCs 16 49  · Suspect reactive in the setting of acute pneumomediastinum  · Trend CBC  Asthma  Assessment & Plan  · Not in an acute exacerbation  · Respiratory protocol  Other hyperlipidemia  Assessment & Plan  · PTA medication of atorvastatin, currently held in the setting of strict NPO status  Hypothyroidism, adult  Assessment & Plan  · PTA medication of levothyroxine, currently held in the setting of strict NPO status  VTE Pharmacologic Prophylaxis: VTE Score: 4 Moderate Risk (Score 3-4) - Pharmacological DVT Prophylaxis Ordered: heparin  Code Status: Level 1 - Full Code per patient  Discussion with family: Patient declined call to        Anticipated Length of Stay: Patient will be admitted on an " observation basis with an anticipated length of stay of less than 2 midnights secondary to pneumomediastinum requiring Pulmonology consult  Total Time Spent on Date of Encounter in care of patient: 75 minutes This time was spent on one or more of the following: performing physical exam; counseling and coordination of care; obtaining or reviewing history; documenting in the medical record; reviewing/ordering tests, medications or procedures; communicating with other healthcare professionals and discussing with patient's family/caregivers  Chief Complaint: Facial swelling, mild dysphagia and voice change    History of Present Illness:  Oneal Colbert is a 71 y o  male with a PMH of HLD, hypothyroidism and left lung mass who presents with complaints of facial swelling, mild dysphagia and voice change that started last evening  Of note, patient is POD #2 of bronchoscopy with EBUS sampling of level 7 LN and MARISOL mass as well as cybrush MARISOL mass and endobronchial biopsies  Upon evaluation in the ED, patient was found to have extensive pneumomediastinum with extension in the neck and upper dorsal soft tissues based on CT of the chest imaging  Patient will be admitted for observation including Pulmonology consult  Review of Systems:  Review of Systems   Constitutional: Negative for chills and fever  HENT: Positive for facial swelling, trouble swallowing and voice change  Respiratory: Negative for cough and shortness of breath  Cardiovascular: Negative for chest pain  Gastrointestinal: Negative for nausea and vomiting  All other systems reviewed and are negative        Past Medical and Surgical History:   Past Medical History:   Diagnosis Date   • Hyperlipidemia    • Hypothyroid    • Mass of left lung        Past Surgical History:   Procedure Laterality Date   • BUNIONECTOMY Left    • COLONOSCOPY     • HERNIA REPAIR     • TONSILLECTOMY         Meds/Allergies:  Prior to Admission medications Medication Sig Start Date End Date Taking? Authorizing Provider   atorvastatin (LIPITOR) 20 mg tablet Take by mouth   Yes Historical Provider, MD   cholecalciferol (VITAMIN D3) 1,000 units tablet Take 1,000 Units by mouth daily   Yes Historical Provider, MD   levothyroxine 75 mcg tablet Take 75 mcg by mouth daily 23  Yes Historical Provider, MD   cephalexin (KEFLEX) 500 mg capsule Take 500 mg by mouth 2 (two) times a day 3/18/23   Historical Provider, MD   oxyCODONE (ROXICODONE) 5 immediate release tablet Take 5 mg by mouth every 4 to 6 hours as needed for pain 3/20/23   Historical Provider, MD   oxyCODONE (ROXICODONE) 5 immediate release tablet  2/3/23   Historical Provider, MD     I have reviewed home medications with patient personally  Allergies: No Known Allergies    Social History:  Marital Status: Single   Occupation: Unknown  Patient Pre-hospital Living Situation: Home  Patient Pre-hospital Level of Mobility: walks  Patient Pre-hospital Diet Restrictions: None  Substance Use History:   Social History     Substance and Sexual Activity   Alcohol Use Yes   • Alcohol/week: 3 0 standard drinks   • Types: 3 Shots of liquor per week     Social History     Tobacco Use   Smoking Status Former   • Packs/day: 1 00   • Years: 30 00   • Pack years: 30 00   • Types: Cigarettes   • Quit date: 2023   • Years since quittin 2   Smokeless Tobacco Current     Social History     Substance and Sexual Activity   Drug Use Never       Family History:  Family History   Problem Relation Age of Onset   • Lung cancer Mother    • Cancer Father    • Thyroid disease unspecified Paternal Grandfather        Physical Exam:     Vitals:   Blood Pressure: 129/70 (23)  Pulse: 87 (23)  Temperature: 97 7 °F (36 5 °C) (23)  Temp Source: Oral (230)  Respirations: 15 (23)  SpO2: 96 % (23)    Physical Exam  Vitals and nursing note reviewed     Constitutional: General: He is not in acute distress  Appearance: He is not ill-appearing or diaphoretic  HENT:      Head: Normocephalic  Jaw: Swelling present  Neck:      Trachea: Trachea normal  No abnormal tracheal secretions  Cardiovascular:      Rate and Rhythm: Normal rate and regular rhythm  Pulses: Normal pulses  Heart sounds: Normal heart sounds  Pulmonary:      Effort: Pulmonary effort is normal  No respiratory distress  Breath sounds: Normal breath sounds  No wheezing, rhonchi or rales  Chest:      Chest wall: Tenderness and crepitus present  Abdominal:      General: Bowel sounds are normal  There is no distension  Palpations: Abdomen is soft  Tenderness: There is no abdominal tenderness  Musculoskeletal:         General: No swelling  Normal range of motion  Cervical back: Crepitus present  Pain with movement present  Skin:     General: Skin is warm and dry  Neurological:      Mental Status: He is alert and oriented to person, place, and time            Additional Data:     Lab Results:  Results from last 7 days   Lab Units 03/29/23  2040   WBC Thousand/uL 16 49*   HEMOGLOBIN g/dL 13 4   HEMATOCRIT % 40 3   PLATELETS Thousands/uL 345   NEUTROS PCT % 71   LYMPHS PCT % 20   MONOS PCT % 7   EOS PCT % 1     Results from last 7 days   Lab Units 03/29/23  2040   SODIUM mmol/L 139   POTASSIUM mmol/L 4 2   CHLORIDE mmol/L 104   CO2 mmol/L 28   BUN mg/dL 17   CREATININE mg/dL 1 04   ANION GAP mmol/L 7   CALCIUM mg/dL 9 6   ALBUMIN g/dL 4 2   TOTAL BILIRUBIN mg/dL 0 38   ALK PHOS U/L 75   ALT U/L 14   AST U/L 18   GLUCOSE RANDOM mg/dL 93                       Lines/Drains:  Invasive Devices     Peripheral Intravenous Line  Duration           Peripheral IV 03/29/23 Left Antecubital <1 day                    Imaging: Reviewed radiology reports from this admission including: chest CT scan  CT chest with contrast   ED Interpretation by Anisha Workman MD (03/29 5328) FINDINGS:     LUNGS:  No significant change in lingular mass compared to CT of 3/20/2023, measuring approximately 5 2 x 3 5 cm in greatest axial dimensions  The mass abuts the mediastinum and encases left superior pulmonary veins and left upper lobe pulmonary artery   branches  There is obstruction of the lingular bronchus  Surrounding groundglass attenuation may represent lymphangitic spread or areas of inflammation, less likely hemorrhage  The mass abuts the left major fissure  Mild to moderate emphysema      PLEURA:  Unremarkable      HEART/GREAT VESSELS: Heart is unremarkable for patient's age  No thoracic aortic aneurysm      MEDIASTINUM AND SARAI:  Extensive pneumomediastinum with extension into the neck and upper dorsal soft tissues  Small hiatal hernia  Subcentimeter mediastinal lymph nodes are present      CHEST WALL AND LOWER NECK:  Unremarkable      VISUALIZED STRUCTURES IN THE UPPER ABDOMEN:  Multiple ill-defined low-attenuation lesions in the spleen, measuring up t   o 1 4 cm      OSSEOUS STRUCTURES:  No acute fracture or destructive osseous lesion         IMPRESSION:     Extensive pneumomediastinum with extension into the neck and upper dorsal soft tissues      No change in lingular mass compared to CT of 3/20/2023     Ill-defined splenic lesions, for which metastatic disease is not excluded            The study was marked in EPIC for immediate notification      Workstation performed: [id number]      Final Result by Lauretha Castleman, DO (03/29 2256)      Extensive pneumomediastinum with extension into the neck and upper dorsal soft tissues  No change in lingular mass compared to CT of 3/20/2023      Ill-defined splenic lesions, for which metastatic disease is not excluded  The study was marked in [facility] for immediate notification        Workstation performed: [id number]         XR chest 1 view portable   ED Interpretation by Tarsha Ronquillo DO (03/29 0792) Subcutaneous air in bilateral neck and findings concerning for pneumomediastinum  No overt pneumothorax or pneumonia  EKG and Other Studies Reviewed on Admission:   · EKG: No EKG obtained  ** Please Note: This note has been constructed using a voice recognition system   **

## 2023-03-30 NOTE — SPEECH THERAPY NOTE
Speech-Language Pathology Bedside Swallow Evaluation        Patient Name: Ally Tejada    BPZVU'A Date: 3/30/2023     Problem List  Principal Problem:    Pneumomediastinum ([address] )  Active Problems:    Hypothyroidism, adult    Lung mass    Other hyperlipidemia    Asthma    Leukocytosis         Past Medical History  Past Medical History:   Diagnosis Date   • Hyperlipidemia    • Hypothyroid    • Mass of left lung        Past Surgical History  Past Surgical History:   Procedure Laterality Date   • BUNIONECTOMY Left    • COLONOSCOPY     • HERNIA REPAIR     • TONSILLECTOMY         Summary    Pt presents with normal appearing oral and pharyngeal stages of swallowing, no overt s/s aspiration  Pt denied food stasis w/ swallowing, admitted to some discomfort w/ dry swallows of saliva  Recommendations:   Diet: regular diet and thin liquids   Meds: whole with liquid   Frequent Oral care: 2x/day  Other Recommendations/ considerations: no follow up tx needed  Current Medical Status  Pt is a 71 y o  male who presented to [address] [place] [address]  with pneumomediastinum  Pt developed mild dysphagia, facial swelling, and voice changes after EBUS for lung mass biopsy on 3/28  Past medical history:   Please see H&P for details    Special Studies:  CT-chest w/ contrast: 3/29/23 Extensive pneumomediastinum with extension into the neck and upper dorsal soft tissues       Social/Education/Vocational Hx:  Pt lives with family    Swallow Information   Current Risks for Dysphagia & Aspiration: facial swelling, voice changes  Current Symptoms/Concerns: c/o mild dysphagia and voice changes   Current Diet: regular diet and thin liquids   Baseline Diet: regular diet and thin liquids  Takes pills- whole w/ water     Baseline Assessment   Behavior/Cognition: alert  Speech/Language Status: able to participate in conversation and able to follow commands  Patient Positioning: upright in chair     Swallow Mechanism Exam   Facial: symmetrical  Labial: WFL  Lingual: WFL  Velum: unable to visualize  Mandible: adequate ROM  Dentition: adequate  Vocal quality:clear/adequate   Volitional Cough: strong/productive   Respiratory: RA    Consistencies Assessed and Performance   Consistencies Administered: thin liquids, puree and soft solids  (toast), pill w/ water by straw    Oral Stage: pt was able to bite toast and exhibited good mastication/manipulation  Pt took sips of thin liquids by straw w/ good oral control/transfer  No oral residue noted  Pharyngeal Stage: swallow initiation appeared timely and complete w/ no overt s/s aspiration and no c/o food stasis         Esophageal Concerns: none reported      Results Reviewed with: patient   Dysphagia Goals: none at this time      April Mirna Ramesh [facility]-SL[facility]  Speech Pathologist  [place] license # [id number] [facility] [id number]  [place] license # [id number]  Available via [employer]

## 2023-03-30 NOTE — DISCHARGE SUMMARY
"[facility]  Discharge- Shantal Pompa 1950, 71 y o  male MRN: [id number]  Unit/Bed#: W -01 Encounter: [id number]  Primary Care Provider: Usama Moran DO   Date and time admitted to hospital: 3/29/2023  7:58 PM    * Pneumomediastinum [facility])  Assessment & Plan  · Presentation: Patient presented to the ED due complaints of facial swelling, mild dysphagia and voice change that started last evening  Patient is POD #2 of bronchoscopy with EBUS sampling of level 7 LN and MARISOL mass as well as cybrush MARISOL mass and endobronchial biopsies  · Likely secondary to bronchoscopy   · CT chest: \"Extensive pneumomediastinum with extension into the neck and upper dorsal soft tissues  \"  · Patient saturating well on room air  · Pulmonology following  · No need for further intervention at this time  · Repeat chest x-ray tomorrow morning; cleared for discharge per pulmonology  · Discharge with close pulmonology follow up  Delay PFTs 2-3 weeks and may require repeat bronch if prior tissue samples non-diagnostic for malignancy  Lung mass  Assessment & Plan  · 2 month history of hemoptysis  · MARISOL/hilar mass concerning for bronchogenic carcinoma which prompted bronchoscopy with EBUS to be performed on 03/28/20233  · CT chest on admission: \"No change in lingular mass compared to CT of 3/20/2023  Ill-defined splenic lesions, for which metastatic disease is not excluded  \"    Plan:   • F/u biopsy results  • Possible AILEEN bronchoscopy as above; scheduled 4/4/23 in setting of pneumomediastinum  • PFT testing X4 weeks per pulmonology    Leukocytosis  Assessment & Plan  Recent Labs     03/29/23  2040 03/30/23  0617   WBC 16 49* 11 12*     · Suspect reactive in the setting of acute pneumomediastinum  · Trending down  Monitor off antibiotics  Asthma  Assessment & Plan  · Not in an acute exacerbation  Other hyperlipidemia  Assessment & Plan  · Continue home atorvastatin       Hypothyroidism, " adult  Assessment & Plan  · PTA medication of levothyroxine, currently held in the setting of strict NPO status  Medical Problems     Resolved Problems  Date Reviewed: 3/30/2023   None       Discharging Resident: Wilfred Bush MD  Discharging Attending: David Cabello MD  PCP: Haresh Pineda DO  Admission Date:   Admission Orders (From admission, onward)     Ordered        03/30/23 1426  Inpatient Admission  Once            03/30/23 0003  Place in Observation  Once                      Discharge Date: 03/31/23    Consultations During Hospital Stay:  · Pulmonology    Procedures Performed:   · N/A     Significant Findings / Test Results:   · Extensive pneumomediastinum with extension into the neck and upper dorsal soft tissues noted on CT  · Repeat CXR 3/31/23 showed no worsening of pneumomediastinum  Incidental Findings:   · None    Test Results Pending at Discharge (will require follow up):  · Prior biopsy for evaluation of possible malignancy      Outpatient Tests Requested:  • Plan for joseph bronch 4/4/23 with Dr Yusef Moore if prior tissue sampling is not diagnostic for malignancy as above   • PFTs warranted, however should be delayed 2-3 weeks per pulmonology    Complications:  None    Reason for Admission: Pneumomediastinum    Hospital Course:   Luis Cox is a 71 y o  male patient who originally presented to the hospital on 3/29/2023 due to complaints of facial swelling, mild dysphagia and voice change that started evening the evening prior to admission  Patient was POD #2 of bronchoscopy with EBUS sampling of level 7 LN and MARISOL mass as well as cybrush MARISOL mass and endobronchial biopsies  Upon evaluation in the ED, patient was found to have extensive pneumomediastinum with extension in the neck and upper dorsal soft tissues based on CT of the chest imaging  Patient passed nursing swallow evaluation without evidence of aspiration and started on regular house diet   Provided analgesia and "supportive management  Pulmonology evaluated  Repeat imaging showed no worsening of pneumomediastinum without complication  Pulmonology cleared for discharge from their perspective  Plan to discharge to home with close outpatient follow up valuation of lung mass and resolution of pneumomediastinum  Please see above list of diagnoses and related plan for additional information  Condition at Discharge: stable    Discharge Day Visit / Exam:   Subjective:  No acute events overnight  Patient sitting up in bed conversing  Normally  Denies SOB and pain  Discussed pulmonology follow up with patient and brother via phone  Further workup pending resolution of pneumomediastinum  No further concerns at this time  Vitals: Blood Pressure: 127/79 (03/31/23 0924)  Pulse: 85 (03/30/23 2309)  Temperature: 98 °F (36 7 °C) (03/31/23 0924)  Temp Source: Oral (03/30/23 2309)  Respirations: 18 (03/31/23 0924)  Height: 5' 7\" (170 2 cm) (03/30/23 0500)  Weight - Scale: 74 9 kg (165 lb 2 oz) (03/30/23 0500)  SpO2: 96 % (03/30/23 2309)    Exam:   Physical Exam  Vitals and nursing note reviewed  Constitutional:       General: He is not in acute distress  Appearance: He is well-developed  HENT:      Head: Normocephalic and atraumatic  Mouth/Throat:      Mouth: Mucous membranes are moist       Pharynx: Oropharynx is clear  Eyes:      Conjunctiva/sclera: Conjunctivae normal    Cardiovascular:      Rate and Rhythm: Normal rate and regular rhythm  Heart sounds: No murmur heard  Pulmonary:      Effort: Pulmonary effort is normal  No respiratory distress  Breath sounds: Wheezing present  Comments: Mild late inspiratory wheeze  Abdominal:      Palpations: Abdomen is soft  Tenderness: There is no abdominal tenderness  Musculoskeletal:         General: No swelling  Cervical back: Neck supple  Right lower leg: No edema  Left lower leg: No edema  Skin:     General: Skin is warm and dry      " Capillary Refill: Capillary refill takes less than 2 seconds  Coloration: Skin is not pale  Findings: No erythema or rash  Comments: Subcutaneous emphysema noted on palpation of left neck and shoulder   Neurological:      General: No focal deficit present  Mental Status: He is alert and oriented to person, place, and time  Psychiatric:         Mood and Affect: Mood normal           Discussion with Family: Updated  (brother) via phone  Discharge instructions/Information to patient and family:   See after visit summary for information provided to patient and family  Provisions for Follow-Up Care:  See after visit summary for information related to follow-up care and any pertinent home health orders  Disposition:   Home    Planned Readmission: None    Discharge Medications:  See after visit summary for reconciled discharge medications provided to patient and/or family        **Please Note: This note may have been constructed using a voice recognition system**

## 2023-03-30 NOTE — ASSESSMENT & PLAN NOTE
"· 2 month history of hemoptysis  · MARISOL/hilar mass concerning for bronchogenic carcinoma which prompted bronchoscopy with EBUS to be performed on 03/28/20233  · CT chest on admission: \"No change in lingular mass compared to CT of 3/20/2023  Ill-defined splenic lesions, for which metastatic disease is not excluded  \"    Plan:   • F/u biopsy results  • Possible AILEEN bronchoscopy as above; scheduled 4/4/23 in setting of pneumomediastinum  • PFT testing X4 weeks per pulmonology  "

## 2023-03-31 ENCOUNTER — APPOINTMENT (INPATIENT)
Dept: RADIOLOGY | Facility: HOSPITAL | Age: 73
End: 2023-03-31

## 2023-03-31 VITALS
SYSTOLIC BLOOD PRESSURE: 127 MMHG | HEIGHT: 67 IN | HEART RATE: 85 BPM | DIASTOLIC BLOOD PRESSURE: 79 MMHG | OXYGEN SATURATION: 96 % | BODY MASS INDEX: 25.92 KG/M2 | RESPIRATION RATE: 18 BRPM | WEIGHT: 165.12 LBS | TEMPERATURE: 98 F

## 2023-03-31 LAB
SCAN RESULT: NORMAL
SCAN RESULT: NORMAL

## 2023-03-31 RX ADMIN — LEVOTHYROXINE SODIUM 75 MCG: 75 TABLET ORAL at 06:24

## 2023-03-31 RX ADMIN — MELATONIN 3 MG: at 00:08

## 2023-03-31 NOTE — PLAN OF CARE
Problem: PAIN - ADULT  Goal: Verbalizes/displays adequate comfort level or baseline comfort level  Description: Interventions:  - Encourage patient to monitor pain and request assistance  - Assess pain using appropriate pain scale  - Administer analgesics based on type and severity of pain and evaluate response  - Implement non-pharmacological measures as appropriate and evaluate response  - Consider cultural and social influences on pain and pain management  - Notify physician/advanced practitioner if interventions unsuccessful or patient reports new pain  Outcome: Progressing     Problem: INFECTION - ADULT  Goal: Absence or prevention of progression during hospitalization  Description: INTERVENTIONS:  - Assess and monitor for signs and symptoms of infection  - Monitor lab/diagnostic results  - Monitor all insertion sites, i e  indwelling lines, tubes, and drains  - Monitor endotracheal if appropriate and nasal secretions for changes in amount and color  - [place] appropriate cooling/warming therapies per order  - Administer medications as ordered  - Instruct and encourage patient and family to use good hand hygiene technique  - Identify and instruct in appropriate isolation precautions for identified infection/condition  Outcome: Progressing  Goal: Absence of fever/infection during neutropenic period  Description: INTERVENTIONS:  - Monitor WBC    Outcome: Progressing     Problem: SAFETY ADULT  Goal: Patient will remain free of falls  Description: INTERVENTIONS:  - Educate patient/family on patient safety including physical limitations  - Instruct patient to call for assistance with activity   - Consult OT/PT to assist with strengthening/mobility   - Keep Call bell within reach  - Keep bed low and locked with side rails adjusted as appropriate  - Keep care items and personal belongings within reach  - Initiate and maintain comfort rounds  - Make Fall Risk Sign visible to staff  - Offer Toileting every 2 Hours, in advance of need  - Initiate/Maintain alarm  - Obtain necessary fall risk management equipment:   - Apply yellow socks and bracelet for high fall risk patients  - Consider moving patient to room near nurses station  Outcome: Progressing  Goal: Maintain or return to baseline ADL function  Description: INTERVENTIONS:  -  Assess patient's ability to carry out ADLs; assess patient's baseline for ADL function and identify physical deficits which impact ability to perform ADLs (bathing, care of mouth/teeth, toileting, grooming, dressing, etc )  - Assess/evaluate cause of self-care deficits   - Assess range of motion  - Assess patient's mobility; develop plan if impaired  - Assess patient's need for assistive devices and provide as appropriate  - Encourage maximum independence but intervene and supervise when necessary  - Involve family in performance of ADLs  - Assess for home care needs following discharge   - Consider OT consult to assist with ADL evaluation and planning for discharge  - Provide patient education as appropriate  Outcome: Progressing  Goal: Maintains/Returns to pre admission functional level  Description: INTERVENTIONS:  - Perform BMAT or MOVE assessment daily    - Set and communicate daily mobility goal to care team and patient/family/caregiver  - Collaborate with rehabilitation services on mobility goals if consulted  - Perform Range of Motion 3 times a day  - Reposition patient every 2 hours    - Dangle patient 3 times a day  - Stand patient 3 times a day  - Ambulate patient 3 times a day  - Out of bed to chair 3 times a day   - Out of bed for meals 3 times a day  - Out of bed for toileting  - Record patient progress and toleration of activity level   Outcome: Progressing     Problem: DISCHARGE PLANNING  Goal: Discharge to home or other facility with appropriate resources  Description: INTERVENTIONS:  - Identify barriers to discharge w/patient and caregiver  - Arrange for needed discharge resources and transportation as appropriate  - Identify discharge learning needs (meds, wound care, etc )  - Arrange for interpretive services to assist at discharge as needed  - Refer to Case Management Department for coordinating discharge planning if the patient needs post-hospital services based on physician/advanced practitioner order or complex needs related to functional status, cognitive ability, or social support system  Outcome: Progressing     Problem: Knowledge Deficit  Goal: Patient/family/caregiver demonstrates understanding of disease process, treatment plan, medications, and discharge instructions  Description: Complete learning assessment and assess knowledge base    Interventions:  - Provide teaching at level of understanding  - Provide teaching via preferred learning methods  Outcome: Progressing

## 2023-03-31 NOTE — DISCHARGE INSTR - AVS FIRST PAGE
Dear Sudhakar Herrmann,     It was our pleasure to care for you here at [facility]  It is our hope that we were always able to exceed the expected standards for your care during your stay  You were hospitalized due to pneumomediastinum  You were cared for on the fourth floor by Carmen Palencia MD under the service of Yen Brooks MD with the [address] [place] [address] [facility] who covers for your primary care physician (PCP), Kevin Weber DO, while you were hospitalized  If you have any questions or concerns related to this hospitalization, you may contact us at [phone]  For follow up as well as any medication refills, we recommend that you follow up with your primary care physician  A registered nurse will reach out to you by phone within a few days after your discharge to answer any additional questions that you may have after going home  However, at this time we provide for you here, the most important instructions / recommendations at discharge:     Notable Medication Adjustments -   No changes to prescription medications at this time  Continue to take over-the-counter analgesics including Tylenol/Motrin as needed for pain  May take antitussive such as Robitussin outpatient for cough as needed  Testing Required after Discharge -   Pulmonary function testing 2 to 3 weeks following discharge per pulmonology  May require repeat bronchoscopy with biopsy pending pathology results of prior study  Important follow up information -   Please follow-up with your primary care provider within 1 week of hospital discharge  Please follow-up with pulmonology to assess resolution of pneumomediastinum and further work-up as above  Other Instructions -   Thank you for allowing us to participate in your care    Please review this entire after visit summary as additional general instructions including medication list, appointments, activity, diet, any pertinent wound care, and other additional recommendations from your care team that may be provided for you        Sincerely,     Alirio Car MD

## 2023-03-31 NOTE — TELEPHONE ENCOUNTER
Called patient, left message with the following: Flow cytometry back on Level 7 LN, normal population  Advised remainder of pathology results are pending, will hopefully be finalized Monday and I will call him once I obtain results      Anjelica Art, DO

## 2023-03-31 NOTE — PROGRESS NOTES
"Progress Note - Pulmonary   Vonna Fell 71 y o  male MRN: [id number]  Unit/Bed#: W -01 Encounter: [id number]      Assessment & Recommendations:   1  Pneumomediastinum post bronchoscopy with EBUS tissue sampling on 3/28  • Continue supportive care and monitoring  • No need for further interventions at this time  • No fluid collections, afebrile and WBC downtrending - monitor off abx at this time  • Repeat CXR without worsening and no PTX  • Stable for d/c home from pulmonary standpoint     2  Mild dysphagia   • Secondary to #1  • No aspiration on speech evaluation     3  MARISOL mass lesion concerning for bronchogenic malignancy  • Await further biopsy results  • Initially concerned with pneumomediastinum and Ulisses bronch, given improvements, d/w Dr Ifeoma Plummer and is OK to proceed  Will plan for Ulisses bronch 4/4 with Dr Ifeoma Plummer if prior tissue sampling is not diagnostic for malignancy  • Will need to likely delay PFT testing 2-3 weeks     4  Nicotine dependence in remission - remain tobacco free     I tiger texted Dr Edgardo Kohler at time of evaluation    Subjective:   Feeling improved, eager to go home, no chest pain, no pleurisy, mild dysphagia improving    Objective:       Vitals: Blood pressure 127/79, pulse 85, temperature 98 °F (36 7 °C), resp  rate 18, height 5' 7\" (1 702 m), weight 74 9 kg (165 lb 2 oz), SpO2 96 % , RA, Body mass index is 25 86 kg/m²      No intake or output data in the 24 hours ending 03/31/23 1047      Physical Exam  Gen: Awake, alert, oriented x 3, no acute distress  HEENT: Mucous membranes moist, no oral lesions, no thrush  NECK: No accessory muscle use, JVP not elevated, reduced crepitus in neck, trachea midline  Cardiac: Regular, single S1, single S2, no murmurs, no rubs, no gallops  Lungs: clear, no wheeze, no rales, resolved crepitus on anterior chest  Abdomen: normoactive bowel sounds, soft nontender, nondistended, no rebound or rigidity, no guarding  Extremities: no cyanosis, no " "clubbing, no edema    Labs: I have personally reviewed pertinent lab results  Laboratory and Diagnostics  Results from last 7 days   Lab Units 03/30/23  0617 03/29/23  2040   WBC Thousand/uL 11 12* 16 49*   HEMOGLOBIN g/dL 12 5 13 4   HEMATOCRIT % 37 5 40 3   PLATELETS Thousands/uL 295 345   NEUTROS PCT %  --  71   MONOS PCT %  --  7     Results from last 7 days   Lab Units 03/30/23  0617 03/29/23  2040   SODIUM mmol/L 138 139   POTASSIUM mmol/L 3 8 4 2   CHLORIDE mmol/L 104 104   CO2 mmol/L 26 28   ANION GAP mmol/L 8 7   BUN mg/dL 14 17   CREATININE mg/dL 0 86 1 04   CALCIUM mg/dL 9 0 9 6   GLUCOSE RANDOM mg/dL 81 93   ALT U/L  --  14   AST U/L  --  18   ALK PHOS U/L  --  75   ALBUMIN g/dL  --  4 2   TOTAL BILIRUBIN mg/dL  --  0 38     MARISOL BAL - 83% N, 15% L, cytology pending     Pending Diagnostic Samples: 3/28/2023  Level 7 LN - cytology, flow cytometry  Left hilar mass - cytology, flow cytometry  BAL lingula - cytology  Cytobrush lingula - cytology  Bronchial washing left lung - cytology  Endobronchial biopsy inferior lingula mucosa - histology     Imaging and other studies: new images and reports personally reviewed  CXR 3/31 - stable to slightly improved SQ emphysema, no PTX, stable left perihilar mass lesion    CT Chest 3/29 - extensive pneumomediastinum with extension into neck, no evidence of PTX, no mediastinal fluid collections, no pleural effusions, stable MARISOL mass lesion     Pulmonary function testing: none available      EKG, Pathology, and Other Studies: I have personally reviewed pertinent reports  Echo Stress 1/2023 \"Low risk exercise stress echo with no EKG or echo evidence of stress-induced ischemia at 81% of age-predicted maximal heart rate  \"         Bhargav Short DO, FACP  [facility]    "

## 2023-03-31 NOTE — PLAN OF CARE
Problem: PAIN - ADULT  Goal: Verbalizes/displays adequate comfort level or baseline comfort level  Description: Interventions:  - Encourage patient to monitor pain and request assistance  - Assess pain using appropriate pain scale  - Administer analgesics based on type and severity of pain and evaluate response  - Implement non-pharmacological measures as appropriate and evaluate response  - Consider cultural and social influences on pain and pain management  - Notify physician/advanced practitioner if interventions unsuccessful or patient reports new pain  Outcome: Completed     Problem: INFECTION - ADULT  Goal: Absence or prevention of progression during hospitalization  Description: INTERVENTIONS:  - Assess and monitor for signs and symptoms of infection  - Monitor lab/diagnostic results  - Monitor all insertion sites, i e  indwelling lines, tubes, and drains  - Monitor endotracheal if appropriate and nasal secretions for changes in amount and color  - [place] appropriate cooling/warming therapies per order  - Administer medications as ordered  - Instruct and encourage patient and family to use good hand hygiene technique  - Identify and instruct in appropriate isolation precautions for identified infection/condition  Outcome: Completed  Goal: Absence of fever/infection during neutropenic period  Description: INTERVENTIONS:  - Monitor WBC    Outcome: Completed     Problem: SAFETY ADULT  Goal: Patient will remain free of falls  Description: INTERVENTIONS:  - Educate patient/family on patient safety including physical limitations  - Instruct patient to call for assistance with activity   - Consult OT/PT to assist with strengthening/mobility   - Keep Call bell within reach  - Keep bed low and locked with side rails adjusted as appropriate  - Keep care items and personal belongings within reach  - Initiate and maintain comfort rounds  - Make Fall Risk Sign visible to staff  - Offer Toileting every 2 Hours, in advance of need  - Initiate/Maintain alarm  - Obtain necessary fall risk management equipment:   - Apply yellow socks and bracelet for high fall risk patients  - Consider moving patient to room near nurses station  Outcome: Completed  Goal: Maintain or return to baseline ADL function  Description: INTERVENTIONS:  -  Assess patient's ability to carry out ADLs; assess patient's baseline for ADL function and identify physical deficits which impact ability to perform ADLs (bathing, care of mouth/teeth, toileting, grooming, dressing, etc )  - Assess/evaluate cause of self-care deficits   - Assess range of motion  - Assess patient's mobility; develop plan if impaired  - Assess patient's need for assistive devices and provide as appropriate  - Encourage maximum independence but intervene and supervise when necessary  - Involve family in performance of ADLs  - Assess for home care needs following discharge   - Consider OT consult to assist with ADL evaluation and planning for discharge  - Provide patient education as appropriate  Outcome: Completed  Goal: Maintains/Returns to pre admission functional level  Description: INTERVENTIONS:  - Perform BMAT or MOVE assessment daily    - Set and communicate daily mobility goal to care team and patient/family/caregiver  - Collaborate with rehabilitation services on mobility goals if consulted  - Perform Range of Motion 3 times a day  - Reposition patient every 2 hours    - Dangle patient 3 times a day  - Stand patient 3 times a day  - Ambulate patient 3 times a day  - Out of bed to chair 3 times a day   - Out of bed for meals 3 times a day  - Out of bed for toileting  - Record patient progress and toleration of activity level   Outcome: Completed     Problem: DISCHARGE PLANNING  Goal: Discharge to home or other facility with appropriate resources  Description: INTERVENTIONS:  - Identify barriers to discharge w/patient and caregiver  - Arrange for needed discharge resources and transportation as appropriate  - Identify discharge learning needs (meds, wound care, etc )  - Arrange for interpretive services to assist at discharge as needed  - Refer to Case Management Department for coordinating discharge planning if the patient needs post-hospital services based on physician/advanced practitioner order or complex needs related to functional status, cognitive ability, or social support system  Outcome: Completed

## 2023-04-01 NOTE — ED PROVIDER NOTES
History  Chief Complaint   Patient presents with   • Post-op Problem     Reports having an EBUS yesterday, last night started with left sided facial edema and neck edema with hoarse voice  Reports trouble sleeping, some throat irritation and edema  Sent in for further eval by Dr Khloe Krause     HPI       Prior to Admission Medications   Prescriptions Last Dose Informant Patient Reported? Taking?   atorvastatin (LIPITOR) 20 mg tablet 3/28/2023  Yes Yes   Sig: Take by mouth   cephalexin (KEFLEX) 500 mg capsule   Yes No   Sig: Take 500 mg by mouth 2 (two) times a day   cholecalciferol (VITAMIN D3) 1,000 units tablet 3/28/2023  Yes Yes   Sig: Take 1,000 Units by mouth daily   levothyroxine 75 mcg tablet 3/29/2023  Yes Yes   Sig: Take 75 mcg by mouth daily   oxyCODONE (ROXICODONE) 5 immediate release tablet   Yes No   Sig: Take 5 mg by mouth every 4 to 6 hours as needed for pain   oxyCODONE (ROXICODONE) 5 immediate release tablet   Yes No      Facility-Administered Medications: None       Past Medical History:   Diagnosis Date   • Hyperlipidemia    • Hypothyroid    • Mass of left lung        Past Surgical History:   Procedure Laterality Date   • BUNIONECTOMY Left    • COLONOSCOPY     • HERNIA REPAIR     • TONSILLECTOMY         Family History   Problem Relation Age of Onset   • Lung cancer Mother    • Cancer Father    • Thyroid disease unspecified Paternal Grandfather      I have reviewed and agree with the history as documented  E-Cigarette/Vaping   • E-Cigarette Use Never User      E-Cigarette/Vaping Substances     Social History     Tobacco Use   • Smoking status: Former     Packs/day: 1 00     Years: 30 00     Pack years: 30 00     Types: Cigarettes     Quit date: 2023     Years since quittin 2   • Smokeless tobacco: Current   Vaping Use   • Vaping Use: Never used   Substance Use Topics   • Alcohol use:  Yes     Alcohol/week: 3 0 standard drinks     Types: 3 Shots of liquor per week   • Drug use: Never Review of Systems    Physical Exam  ED Triage Vitals   Temperature Pulse Respirations Blood Pressure SpO2   03/29/23 1920 03/29/23 1920 03/29/23 1920 03/29/23 1920 03/29/23 1920   98 5 °F (36 9 °C) 84 18 142/82 97 %      Temp Source Heart Rate Source Patient Position - Orthostatic VS BP Location FiO2 (%)   03/29/23 1920 03/29/23 1920 03/29/23 2100 03/29/23 2100 --   Oral Monitor Sitting Right arm       Pain Score       03/30/23 0140       4             Orthostatic Vital Signs  Vitals:    03/30/23 1523 03/30/23 1901 03/30/23 2309 03/31/23 0924   BP: 134/78 112/71 127/79 127/79   Pulse: 87 87 85    Patient Position - Orthostatic VS:           Physical Exam    ED Medications  Medications   iohexol (OMNIPAQUE) 350 MG/ML injection (SINGLE-DOSE) 100 mL (85 mL Intravenous Given 3/29/23 2147)   ipratropium-albuterol (DUO-NEB) 0 5-2 5 mg/3 mL inhalation solution 3 mL (3 mL Nebulization Given 3/29/23 2225)   benzonatate (TESSALON PERLES) capsule 100 mg (100 mg Oral Given 3/29/23 2225)   oxyCODONE (ROXICODONE) IR tablet 5 mg (5 mg Oral Given 3/30/23 1353)       Diagnostic Studies  Results Reviewed     Procedure Component Value Units Date/Time    Basic metabolic panel [[id number]] Collected: 03/30/23 0617    Lab Status: Final result Specimen: Blood from Arm, Right Updated: 03/30/23 0717     Sodium 138 mmol/L      Potassium 3 8 mmol/L      Chloride 104 mmol/L      CO2 26 mmol/L      ANION GAP 8 mmol/L      BUN 14 mg/dL      Creatinine 0 86 mg/dL      Glucose 81 mg/dL      Glucose, Fasting 81 mg/dL      Calcium 9 0 mg/dL      eGFR 87 ml/min/1 73sq m     Narrative:      [place] guidelines for Chronic Kidney Disease (CKD):   •  Stage 1 with normal or high GFR (GFR > 90 mL/min/1 73 square meters)  •  Stage 2 Mild CKD (GFR = 60-89 mL/min/1 73 square meters)  •  Stage 3A Moderate CKD (GFR = 45-59 mL/min/1 73 square meters)  •  Stage 3B Moderate CKD (GFR = 30-44 mL/min/1 73 square meters)  •  Stage 4 Severe CKD (GFR = 15-29 mL/min/1 73 square meters)  •  Stage 5 End Stage CKD (GFR <15 mL/min/1 73 square meters)  Note: GFR calculation is accurate only with a steady state creatinine    CBC (With Platelets) [[id number]]  (Abnormal) Collected: 03/30/23 0617    Lab Status: Final result Specimen: Blood from Arm, Right Updated: 03/30/23 0704     WBC 11 12 Thousand/uL      RBC 3 81 Million/uL      Hemoglobin 12 5 g/dL      Hematocrit 37 5 %      MCV 98 fL      MCH 32 8 pg      MCHC 33 3 g/dL      RDW 13 5 %      Platelets 499 Thousands/uL      MPV 9 1 fL     Comprehensive metabolic panel [[id number]] Collected: 03/29/23 2040    Lab Status: Final result Specimen: Blood from Arm, Left Updated: 03/29/23 2104     Sodium 139 mmol/L      Potassium 4 2 mmol/L      Chloride 104 mmol/L      CO2 28 mmol/L      ANION GAP 7 mmol/L      BUN 17 mg/dL      Creatinine 1 04 mg/dL      Glucose 93 mg/dL      Calcium 9 6 mg/dL      AST 18 U/L      ALT 14 U/L      Alkaline Phosphatase 75 U/L      Total Protein 6 8 g/dL      Albumin 4 2 g/dL      Total Bilirubin 0 38 mg/dL      eGFR 71 ml/min/1 73sq m     Narrative:      [place] guidelines for Chronic Kidney Disease (CKD):   •  Stage 1 with normal or high GFR (GFR > 90 mL/min/1 73 square meters)  •  Stage 2 Mild CKD (GFR = 60-89 mL/min/1 73 square meters)  •  Stage 3A Moderate CKD (GFR = 45-59 mL/min/1 73 square meters)  •  Stage 3B Moderate CKD (GFR = 30-44 mL/min/1 73 square meters)  •  Stage 4 Severe CKD (GFR = 15-29 mL/min/1 73 square meters)  •  Stage 5 End Stage CKD (GFR <15 mL/min/1 73 square meters)  Note: GFR calculation is accurate only with a steady state creatinine    CBC and differential [[id number]]  (Abnormal) Collected: 03/29/23 2040    Lab Status: Final result Specimen: Blood from Arm, Left Updated: 03/29/23 2051     WBC 16 49 Thousand/uL      RBC 4 14 Million/uL      Hemoglobin 13 4 g/dL      Hematocrit 40 3 %      MCV 97 fL      MCH 32 4 pg      MCHC 33 3 g/dL      RDW 13 3 %      MPV 9 2 fL      Platelets 873 Thousands/uL      nRBC 0 /100 WBCs      Neutrophils Relative 71 %      Immat GRANS % 0 %      Lymphocytes Relative 20 %      Monocytes Relative 7 %      Eosinophils Relative 1 %      Basophils Relative 1 %      Neutrophils Absolute 11 56 Thousands/µL      Immature Grans Absolute 0 07 Thousand/uL      Lymphocytes Absolute 3 36 Thousands/µL      Monocytes Absolute 1 22 Thousand/µL      Eosinophils Absolute 0 20 Thousand/µL      Basophils Absolute 0 08 Thousands/µL                  CT chest with contrast   ED Interpretation by Ronald Diez MD (03/29 2325)   FINDINGS:     LUNGS:  No significant change in lingular mass compared to CT of 3/20/2023, measuring approximately 5 2 x 3 5 cm in greatest axial dimensions  The mass abuts the mediastinum and encases left superior pulmonary veins and left upper lobe pulmonary artery   branches  There is obstruction of the lingular bronchus  Surrounding groundglass attenuation may represent lymphangitic spread or areas of inflammation, less likely hemorrhage  The mass abuts the left major fissure  Mild to moderate emphysema      PLEURA:  Unremarkable      HEART/GREAT VESSELS: Heart is unremarkable for patient's age  No thoracic aortic aneurysm      MEDIASTINUM AND SARAI:  Extensive pneumomediastinum with extension into the neck and upper dorsal soft tissues  Small hiatal hernia    Subcentimeter mediastinal lymph nodes are present      CHEST WALL AND LOWER NECK:  Unremarkable      VISUALIZED STRUCTURES IN THE UPPER ABDOMEN:  Multiple ill-defined low-attenuation lesions in the spleen, measuring up t   o 1 4 cm      OSSEOUS STRUCTURES:  No acute fracture or destructive osseous lesion         IMPRESSION:     Extensive pneumomediastinum with extension into the neck and upper dorsal soft tissues      No change in lingular mass compared to CT of 3/20/2023     Ill-defined splenic lesions, for which metastatic disease is not excluded            The study was marked in [facility] for immediate notification      Workstation performed: [id number]      Final Result by Evy Neal DO (03/29 2256)      Extensive pneumomediastinum with extension into the neck and upper dorsal soft tissues  No change in lingular mass compared to CT of 3/20/2023      Ill-defined splenic lesions, for which metastatic disease is not excluded  The study was marked in [facility] for immediate notification  Workstation performed: [id number]         XR chest 1 view portable   ED Interpretation by Isa Newby DO (03/29 2144)   Subcutaneous air in bilateral neck and findings concerning for pneumomediastinum  No overt pneumothorax or pneumonia  Final Result by Jose Carlos Sanchez MD (03/30 1048)      Diffuse pneumomediastinum with subcutaneous gas  Left perihilar airspace opacity  Correlate with CT   Workstation performed: [id number]         XR chest pa & lateral    (Results Pending)         Procedures  Procedures      ED Course                                       MDM      Disposition  Final diagnoses:   Pneumomediastinum ([address] )     Time reflects when diagnosis was documented in both MDM as applicable and the Disposition within this note     Time User Action Codes Description Comment    3/30/2023 12:03 AM Cheyenne Lugo Add [J98 2] Pneumomediastinum [facility])       ED Disposition     ED Disposition   Admit    Condition   Stable    Date/Time   u Mar 30, 2023 12:03 AM    Comment   Case was discussed with overnight admitting team and the patient's admission status was agreed to be Admission Status: observation status to the service of Dr David Shepherd              Follow-up Information    None         Discharge Medication List as of 3/31/2023  1:45 PM      CONTINUE these medications which have NOT CHANGED    Details   atorvastatin (LIPITOR) 20 mg tablet Take by mouth, Historical Med      cholecalciferol (VITAMIN D3) 1,000 units tablet Take 1,000 Units by mouth daily, Historical Med      levothyroxine 75 mcg tablet Take 75 mcg by mouth daily, Starting Mon 2/27/2023, Historical Med      !! oxyCODONE (ROXICODONE) 5 immediate release tablet Take 5 mg by mouth every 4 to 6 hours as needed for pain, Starting Mon 3/20/2023, Historical Med      !! oxyCODONE (ROXICODONE) 5 immediate release tablet Historical Med       !! - Potential duplicate medications found  Please discuss with provider  STOP taking these medications       cephalexin (KEFLEX) 500 mg capsule Comments:   Reason for Stopping:             No discharge procedures on file  PDMP Review     None           ED Provider  Attending physically available and evaluated Isela Leavitt  I managed the patient along with the ED Attending      Electronically Signed by labs were drawn and were unremarkable at that time  Dr Megan Dodson did come to bedside to evaluate patient and noted that he would continue to follow with him throughout admission  Case was discussed with MARNI who accepted the patient for further evaluation and management in stable condition  Pneumomediastinum ([facility] [address] ): acute illness or injury  Amount and/or Complexity of Data Reviewed  Labs: ordered  Radiology: ordered and independent interpretation performed  Risk  Prescription drug management  Decision regarding hospitalization  Disposition  Final diagnoses:   Pneumomediastinum ([facility] [address] )     Time reflects when diagnosis was documented in both MDM as applicable and the Disposition within this note     Time User Action Codes Description Comment    3/30/2023 12:03 AM Patty Hernandez [J98 2] Pneumomediastinum [facility])       ED Disposition     ED Disposition   Admit    Condition   Stable    Date/Time   u Mar 30, 2023 12:03 AM    Comment   Case was discussed with overnight admitting team and the patient's admission status was agreed to be Admission Status: observation status to the service of Dr Cecelia Diaz   Follow-up Information    None         Discharge Medication List as of 3/31/2023  1:45 PM      CONTINUE these medications which have NOT CHANGED    Details   atorvastatin (LIPITOR) 20 mg tablet Take by mouth, Historical Med      cholecalciferol (VITAMIN D3) 1,000 units tablet Take 1,000 Units by mouth daily, Historical Med      levothyroxine 75 mcg tablet Take 75 mcg by mouth daily, Starting Mon 2/27/2023, Historical Med      !! oxyCODONE (ROXICODONE) 5 immediate release tablet Take 5 mg by mouth every 4 to 6 hours as needed for pain, Starting Mon 3/20/2023, Historical Med      !! oxyCODONE (ROXICODONE) 5 immediate release tablet Historical Med       !! - Potential duplicate medications found  Please discuss with provider        STOP taking these medications       cephalexin (KEFLEX) 500 mg capsule Comments:   Reason for Stopping:             No discharge procedures on file  PDMP Review     None           ED Provider  Attending physically available and evaluated [place]  ELEAZAR managed the patient along with the ED Attending      Electronically Signed by         Karyle Short, DO  04/09/23 0239

## 2023-04-02 NOTE — RESULT ENCOUNTER NOTE
I called and reviewed the results with Araceli Gu  I noted level 7LN was without evidence of malignancy and was normal lymphoid tissue  I advised that the left hilar biopsies were NON-DIAGNOSTIC and that further tissue sampling is recommended as the images are consistent with a malignancy  I recommended pursuing Ulisses Bronchoscopy for biopsy - he was in agreement with this but reports he wants to delay by another week due to continued recovery from his pneumomediastinum  Irena Peters, can you cancel his case for this Tuesday 4/4 and reschedule with Dr Lilo Lopez for the following week    I have also advised to reschedule PFT testing for another 2-3 weeks to allow further recovery  He was in agreement with this plan      Kanu Rosales, DO

## 2023-04-03 ENCOUNTER — DOCUMENTATION (OUTPATIENT)
Dept: PULMONOLOGY | Facility: CLINIC | Age: 73
End: 2023-04-03

## 2023-04-03 NOTE — PROGRESS NOTES
[facility] 8/25/2022   · Pulmonary Telephone note    Obtained results of the patient's forceps biopsies - were not processed with prior cytology specimens  These revealed NSCLCa - adenocarcinoma  I called the patient and advised him of the results  We no longer need to pursue Navigational bronchoscopy  I reviewed with him we will reschedule PFTs for next 1-2 weeks given recent pneumomediastinum  I also advised to obtain CT/PET as scheduled for 4/11/23  Halina Solorzano - can you please cancel Ulisses Bronch and assist in rescheduling his PFTs for another 1-2 weeks after current date  I updated Dr Ifeoma Plummer and further management plans to be determined by above results as well as Thoracic Tumor Board discussions      Douglas See, DO

## 2023-04-03 NOTE — TELEPHONE ENCOUNTER
Patient called asking if Dr Luis Bahena can give him a call regarding tests results  I advised him of Dr Madelyn Weinstein previous note but he would like more clarification  Please advise

## 2023-04-07 ENCOUNTER — DOCUMENTATION (OUTPATIENT)
Dept: HEMATOLOGY ONCOLOGY | Facility: CLINIC | Age: 73
End: 2023-04-07

## 2023-04-07 NOTE — PROGRESS NOTES
In-basket message received from Dr Jimbo Gee to add patient to thoracic tumor board on 4/17/2023  Chart reviewed and tumor board prep started  Pending PET CT scheduled for 4/11/2023  I will follow up on the results

## 2023-04-25 DIAGNOSIS — J44.9 CHRONIC OBSTRUCTIVE PULMONARY DISEASE, UNSPECIFIED COPD TYPE (HCC): Primary | ICD-10-CM

## 2023-04-25 RX ORDER — UMECLIDINIUM BROMIDE AND VILANTEROL TRIFENATATE 62.5; 25 UG/1; UG/1
1 POWDER RESPIRATORY (INHALATION) DAILY
Qty: 60 BLISTER | Refills: 5 | Status: SHIPPED | OUTPATIENT
Start: 2023-04-25 | End: 2023-10-22

## 2024-02-07 ENCOUNTER — OFFICE VISIT (OUTPATIENT)
Dept: OBGYN CLINIC | Facility: HOSPITAL | Age: 74
End: 2024-02-07
Payer: MEDICARE

## 2024-02-07 ENCOUNTER — HOSPITAL ENCOUNTER (OUTPATIENT)
Dept: RADIOLOGY | Facility: HOSPITAL | Age: 74
Discharge: HOME | End: 2024-02-07
Attending: ORTHOPAEDIC SURGERY
Payer: MEDICARE

## 2024-02-07 VITALS
WEIGHT: 165.12 LBS | SYSTOLIC BLOOD PRESSURE: 121 MMHG | BODY MASS INDEX: 25.92 KG/M2 | HEIGHT: 67 IN | DIASTOLIC BLOOD PRESSURE: 78 MMHG | HEART RATE: 93 BPM

## 2024-02-07 DIAGNOSIS — M48.062 SPINAL STENOSIS OF LUMBAR REGION WITH NEUROGENIC CLAUDICATION: ICD-10-CM

## 2024-02-07 DIAGNOSIS — R52 PAIN: ICD-10-CM

## 2024-02-07 DIAGNOSIS — M54.16 LUMBAR RADICULOPATHY: ICD-10-CM

## 2024-02-07 DIAGNOSIS — M51.36 DEGENERATIVE DISC DISEASE, LUMBAR: ICD-10-CM

## 2024-02-07 DIAGNOSIS — R52 PAIN: Primary | ICD-10-CM

## 2024-02-07 PROCEDURE — 72110 X-RAY EXAM L-2 SPINE 4/>VWS: CPT

## 2024-02-07 PROCEDURE — 99204 OFFICE O/P NEW MOD 45 MIN: CPT | Performed by: ORTHOPAEDIC SURGERY

## 2024-02-07 RX ORDER — METHYLPREDNISOLONE 4 MG/1
TABLET ORAL
Qty: 21 TABLET | Refills: 0 | Status: SHIPPED | OUTPATIENT
Start: 2024-02-07

## 2024-02-07 NOTE — PROGRESS NOTES
Assessment & Plan/Medical Decision Makin y.o. male with Back Pain, Bilateral Radicular Leg Pain, Ambulatory Dysfunction, and Neurogenic Claudication and imaging findings most notable for lumbar spondylosis , L3-4 spinal stenosis        The clinical, physical and imaging findings were reviewed with the patient.  Kahlil  has a constellation of findings consistent with Lumbar Radiculopathy and Neurogenic Claudication in the setting of lumbar degenerative disease.  Ongoing low back, gluteal and bilateral lower extremity pain with ambulatory dysfunction. Positive shopping cart sign.  Fortunately patient remains neurologically intact and functional. Physical exam showing decreased lumbar ROM.  We discussed the treatment options including physical therapy, at home exercises, activity modifications, chiropractic medicine, oral medications, interventional spine procedures.  At this time recommend continued conservative treatments. Did briefly discuss role of surgical intervention, however would recommend patient exhaust all conservative treatment first.  Referral to pain management for evaluation and treatment. Discussed potential role of steroid injection at or near the source of pain to provide targeted relief.  Medrol dose dusty rx sent to patient's pharmacy. Discussed reasoning for prescribing medication, proper usage, and potential side effects.  Patient instructed to return to office/ER sooner if symptoms are not improving, getting worse, or new worrisome/neurologic symptoms arise.  Patient will follow up in about 3 months for re-evaluation.       Subjective:      Chief Complaint: Back Pain    HPI:  Kahlil Jackson is a 73 y.o. male presenting for initial visit with chief complaint of back and bilateral lower extremity pain. Ongoing back pain for years that has been worsening over the past few months. Reports pain across low back into bilateral gluteal region into anterior aspect of bilateral legs to level of  ankles. Intermittent muscle cramping. Pain worse with prolonged standing and walking. Uses cane for ambulation. Only able to walk a few blocks before needing to stop and rest before resuming. Improves with sitting/rest and leaning forward. Positive shopping cart sign. Pain and symptoms have been greatly interfering with his daily activites and functioning. Denies numbness/tingling or baudilio lower extremity weakness. Denies any baudilio trauma. Denies fever or chills, no night sweats. Denies any bladder or bowel changes.      PMH hypothyroidism, lung cancer s/p MARISOL sleeve lobectomy on 8/8/23 .    Conservative therapy includes the following:   Medications: ibuprofen    Injections: denies recent     Physical Therapy: denies recent, did PT back in 2016. Has been going to / twice a week recently to work on core and muscle strengthening exercises  Chiropractic Medicine: has not attempted  Accupunture/Massage Therapy: has not attempted   These therapeutic modalities were ineffective at providing sustained pain relief/functional improvement.     Nicotine dependent: denies  Occupation: n/a  Living situation: Lives with family   ADLs: patient is able to perform     Objective:     Family History   Problem Relation Age of Onset    Lung cancer Mother     Cancer Father     Thyroid disease unspecified Paternal Grandfather        Past Medical History:   Diagnosis Date    Hyperlipidemia     Hypothyroid     Mass of left lung        Current Outpatient Medications   Medication Sig Dispense Refill    atorvastatin (LIPITOR) 20 mg tablet Take by mouth      cholecalciferol (VITAMIN D3) 1,000 units tablet Take 1,000 Units by mouth daily      levothyroxine 75 mcg tablet Take 75 mcg by mouth daily      methylPREDNISolone 4 MG tablet therapy pack Use as directed on package. Do not take other anti-inflammatory (NSAID) medications while on Medrol dose dusty. You should not suddenly stop using these medications. Follow  the instructions listed on the prescription about tapering your dose. 21 tablet 0     No current facility-administered medications for this visit.       Past Surgical History:   Procedure Laterality Date    BUNIONECTOMY Left     COLONOSCOPY      HERNIA REPAIR      TONSILLECTOMY         Social History     Socioeconomic History    Marital status: Single     Spouse name: Not on file    Number of children: Not on file    Years of education: Not on file    Highest education level: Not on file   Occupational History    Not on file   Tobacco Use    Smoking status: Former     Current packs/day: 0.00     Average packs/day: 1 pack/day for 30.0 years (30.0 ttl pk-yrs)     Types: Cigarettes     Start date: 1993     Quit date: 2023     Years since quittin.1    Smokeless tobacco: Current   Vaping Use    Vaping status: Never Used   Substance and Sexual Activity    Alcohol use: Yes     Alcohol/week: 3.0 standard drinks of alcohol     Types: 3 Shots of liquor per week    Drug use: Never    Sexual activity: Not on file   Other Topics Concern    Not on file   Social History Narrative    Not on file     Social Determinants of Health     Financial Resource Strain: Low Risk  (2023)    Received from [facility] ([facility]    Overall Financial Resource Strain (CARDIA)     Difficulty of Paying Living Expenses: Not hard at all   Food Insecurity: No Food Insecurity (2023)    Received from [facility] ([facility]    Hunger Vital Sign     Worried About Running Out of Food in the Last Year: Never true     Ran Out of Food in the Last Year: Never true   Transportation Needs: No Transportation Needs (2023)    Received from [facility] ([facility]    PRAPARE - Transportation     Lack of Transportation (Medical): No     Lack of Transportation (Non-Medical): No   Physical Activity: Not on file   Stress: Not on file   Social Connections:  "Not on file   Intimate Partner Violence: Not on file   Housing Stability: Low Risk  (7/21/2023)    Received from [facility] ([facility]    Housing Stability     Housing Stability: I have a steady place to live       No Known Allergies    Review of Systems  General- denies fever/chills  HEENT- denies hearing loss or sore throat  Eyes- denies eye pain or visual disturbances, denies red eyes  Respiratory- denies cough or SOB  Cardio- denies chest pain or palpitations  GI- denies abdominal pain  Endocrine- denies urinary frequency  Urinary- denies pain with urination  Musculoskeletal- Negative except noted above  Skin- denies rashes or wounds  Neurological- denies dizziness or headache  Psychiatric- denies anxiety or difficulty concentrating    Physical Exam  /78   Pulse 93   Ht 5' 7\" (1.702 m)   Wt 74.9 kg (165 lb 2 oz)   BMI 25.86 kg/m²     General/Constitutional: No apparent distress: well-nourished and well developed.  Lymphatic: No appreciable lymphadenopathy  Respiratory: Non-labored breathing  Vascular: No edema, swelling or tenderness, except as noted in detailed exam.  Integumentary: No impressive skin lesions present, except as noted in detailed exam.  Psych: Normal mood and affect, oriented to person, place and time.  MSK: normal other than stated in HPI and exam  Gait & balance: no evidence of myelopathic gait, ambulates with a Cane    Lumbar spine range of motion:  -Forward flexion to 90  -Extension to neutral  -Lateral bend 25 right, 25 left  -Rotation 25 right, 25 left  There tenderness with palpation along lumbar paraspinal musculature, no midline tenderness     Neurologic:    Lower Extremity Motor Function    Right  Left    Iliopsoas  5/5  5/5    Quadriceps 5/5 5/5   Tibialis anterior  5/5  5/5    EHL  5/5  5/5    Gastroc. muscle  5/5  5/5    Heel rise  5/5  5/5    Toe rise  5/5  5/5      Sensory: light touch is intact to bilateral upper and lower extremities " "    Reflexes:    Right Left   Patellar 1+ 1+   Achilles 1+ 1+   Babinski neg neg     Other tests:  Straight Leg Raise: negative  Eric SI: negative  ANETTE SI: negative  Greater troch: no tenderness   Internal/external hip ROM: intact, no pain   Flexion/extension knee ROM: intact, no pain   Vascular: WWP extremities, 2+DP bilateral      Diagnostic Tests   IMAGING: I have personally reviewed the images and these are my findings:  Lumbar Spine X-rays from 2/7/2024: multi level lumbar spondylosis with loss of disc height, osteophyte formation and facet hypertrophy, no apparent spondylolisthesis, no appreciated lytic/blastic lesions, no obvious instability    Lumbar Spine MRI from 1/11/2024: multi level lumbar disc degeneration with disc desiccation, loss of disc height, facet and ligamentum hypertrophy, L3-4 central, lateral recess, foraminal stenosis     Electronic Medical Records were reviewed including office notes, imaging studies    Procedures, if performed today     None performed       Portions of the record may have been created with voice recognition software.  Occasional wrong word or \"sound a like\" substitutions may have occurred due to the inherent limitations of voice recognition software.  Read the chart carefully and recognize, using context, where substitutions have occurred.  "

## 2024-02-27 ENCOUNTER — CONSULT (OUTPATIENT)
Dept: PAIN MEDICINE | Facility: CLINIC | Age: 74
End: 2024-02-27
Payer: MEDICARE

## 2024-02-27 ENCOUNTER — TELEPHONE (OUTPATIENT)
Dept: PAIN MEDICINE | Facility: CLINIC | Age: 74
End: 2024-02-27

## 2024-02-27 VITALS
HEIGHT: 67 IN | BODY MASS INDEX: 26.06 KG/M2 | WEIGHT: 166 LBS | DIASTOLIC BLOOD PRESSURE: 78 MMHG | HEART RATE: 103 BPM | SYSTOLIC BLOOD PRESSURE: 125 MMHG

## 2024-02-27 DIAGNOSIS — M54.16 LUMBAR RADICULOPATHY: ICD-10-CM

## 2024-02-27 DIAGNOSIS — M51.36 DEGENERATIVE DISC DISEASE, LUMBAR: ICD-10-CM

## 2024-02-27 DIAGNOSIS — M48.062 SPINAL STENOSIS OF LUMBAR REGION WITH NEUROGENIC CLAUDICATION: ICD-10-CM

## 2024-02-27 PROCEDURE — 99204 OFFICE O/P NEW MOD 45 MIN: CPT | Performed by: PHYSICAL MEDICINE & REHABILITATION

## 2024-02-27 NOTE — PROGRESS NOTES
Assessment:  1. Degenerative disc disease, lumbar    2. Spinal stenosis of lumbar region with neurogenic claudication    3. Lumbar radiculopathy        Plan:    Mr. Jackson is a pleasant 73-year-old male significant past medical history of lung CA status postsurgical excision, hypothyroidism presents to [facility] for initial evaluation and referral from Dr. Hartley regarding lumbar spinal stenosis with neurogenic claudication.  During today's evaluation he is demonstrating clinical and diagnostic evidence of lumbar radiculopathy likely in relation to the multilevel central and foraminal narrowing most notable at L3-L4 and L4-L5.  He is not currently a surgical candidate and was opted towards interventional considerations prior to anything more invasive.  At this time interventional approaches will be beneficial and warranted and we will plan for an L5-S1 LESI under fluoroscopy guidance.  We did discuss extensively alternative approaches including transforaminal epidural steroid injection and patient did inquire about neuromodulation.  Advised him we would hold off on neuromodulation considerations due to the current pathology in his spine as well as pending results from the injections.  All questions answered, patient is agreeable with plan.  Complete risks and benefits including bleeding, infection, tissue reaction, nerve injury and allergic reaction were discussed. The approach was demonstrated using models and literature was provided. Verbal and written consent was obtained.  History of Present Illness:    Kahlil Jackson is a 73 y.o. male who presents to [facility] for initial evaluation of the above stated pain complaints. The patient has a past medical and chronic pain history as outlined in the assessment section. He was referred by Della Carlson, MEGAN  [address]  [place] [address]   Patient presents to [facility] for  initial evaluation regarding 4+ months duration of isolated low back pain with radiating symptoms into bilateral lower extremities.  Denies any significant inciting event or recent trauma.  Today reports moderate to severe pain rated 8 out of 10 and interfere with activities.  Pain is nearly constant 60 to 95% of the time that is present throughout the day and night.  Describes symptoms of shooting, sharp, throbbing pain.  Denies any significant lower extremity weakness or falls.  Requires a cane and wheelchair for ambulation at times.  Symptoms are worse with prayer, standing, walking.  Reports moderate relief with previous injections and physical therapy.  Denies smoking or marijuana use.  Admits to social alcohol use.  Currently using Motrin and recently tried oral steroids which did help.  Presents today for initial evaluation.  Review of Systems:    Review of Systems   Constitutional:  Negative for chills and fatigue.   HENT:  Negative for ear pain, mouth sores and sinus pressure.    Eyes:  Negative for pain, redness and visual disturbance.   Respiratory:  Positive for wheezing. Negative for shortness of breath.    Cardiovascular:  Negative for chest pain and palpitations.   Gastrointestinal:  Negative for abdominal pain and nausea.   Endocrine: Negative for polyphagia.   Musculoskeletal:  Positive for back pain and gait problem. Negative for arthralgias and neck pain.   Skin:  Negative for wound.   Neurological:  Positive for weakness. Negative for seizures.   Psychiatric/Behavioral:  Positive for sleep disturbance. Negative for dysphoric mood.            Past Medical History:   Diagnosis Date    Hyperlipidemia     Hypothyroid     Mass of left lung        Past Surgical History:   Procedure Laterality Date    BUNIONECTOMY Left     COLONOSCOPY      HERNIA REPAIR      TONSILLECTOMY         Family History   Problem Relation Age of Onset    Lung cancer Mother     Cancer Father     Thyroid disease unspecified  "Paternal Grandfather        Social History     Occupational History    Not on file   Tobacco Use    Smoking status: Former     Current packs/day: 0.00     Average packs/day: 1 pack/day for 30.0 years (30.0 ttl pk-yrs)     Types: Cigarettes     Start date: 1993     Quit date: 2023     Years since quittin.1    Smokeless tobacco: Current   Vaping Use    Vaping status: Never Used   Substance and Sexual Activity    Alcohol use: Yes     Alcohol/week: 3.0 standard drinks of alcohol     Types: 3 Shots of liquor per week    Drug use: Never    Sexual activity: Not on file         Current Outpatient Medications:     atorvastatin (LIPITOR) 20 mg tablet, Take by mouth, Disp: , Rfl:     cholecalciferol (VITAMIN D3) 1,000 units tablet, Take 2,000 Units by mouth daily, Disp: , Rfl:     levothyroxine 75 mcg tablet, Take 100 mcg by mouth daily, Disp: , Rfl:     methylPREDNISolone 4 MG tablet therapy pack, Use as directed on package. Do not take other anti-inflammatory (NSAID) medications while on Medrol dose dusty. You should not suddenly stop using these medications. Follow the instructions listed on the prescription about tapering your dose., Disp: 21 tablet, Rfl: 0    No Known Allergies    Physical Exam:    /78   Pulse 103   Ht 5' 7\" (1.702 m)   Wt 75.3 kg (166 lb)   BMI 26.00 kg/m²     Constitutional: normal, well developed, well nourished, alert, in no distress and non-toxic and no overt pain behavior.  Eyes: anicteric  HEENT: grossly intact  Neck: supple, symmetric, trachea midline and no masses   Pulmonary:even and unlabored  Cardiovascular:No edema or pitting edema present  Skin:Normal without rashes or lesions and well hydrated  Psychiatric:Mood and affect appropriate  Neurologic:Cranial Nerves II-XII grossly intact  Musculoskeletal:normal gait, tenderness to palpation bilateral lumbar paraspinals, decreased active and passive range of motion with lumbar flexion and extension limited by pain, MMT 5 out of " 5 bilateral lower extremities, sensation gross intact to light touch, DTRs within normal limits, positive straight leg raise in the supine position radicular pain into the left leg       [facility]  Outside Information  Results  MRI lumbar spine wo contrast (Order [id number])     MRI lumbar spine wo contrast  Order: [id number]  Impression    Impression:    1. Progression of spondylosis contributing to varying degrees of spinal canal  and foraminal stenosis as described above including severe spinal canal stenosis  at L3-L4 and severe left foraminal stenosis at L4-L5.    2. Stable grade 1 anterolisthesis of L3 over L4 and L4 over L5 which is most  likely related to facet arthropathy.            Workstation:[id number]  Narrative    History: Stenosis of lumbosacral spine    Procedure: MRI of the lumbar spine was obtained with the following sequences:  Sagittal T1, sagittal T2, sagittal STIR and axial T2 weighted images. No  intravenous contrast.    Comparison: MRI lumbar spine dated 9/29/2016 and 11/29/2016    Findings: For the purposes of this dictation, the lumbar vertebrae are labeled  from a caudal to cranial direction, the first vertebra with lumbar morphology is  labeled as L5.    Conus and lower thoracic cord: The conus terminates at the L2 level and is  unremarkable. The distal thoracic cord is normal in caliber and signal. There is  redundancy of the upper cauda equina nerve roots secondary to spinal canal  stenosis more inferiorly.    Marrow and Alignment: There is maintenance of the lumbar lordosis with stable 2  mm anterolisthesis of L3 over L4 and stable 4 mm anterolisthesis of L4 over L5.  The vertebral bodies are otherwise normal in height and alignment. Multilevel  degenerative endplate changes. No focal suspicious marrow signal abnormality.    Disc desiccation and mild disc space narrowing from L2-L3 through L4-L5 which  appears mildly progressed. Multilevel anterior endplate osteophyte  formation.    L1-L2: No disc herniation. No spinal canal or foraminal stenosis.    L2-L3: Mild disc bulge with a new superimposed broad left paracentral disc  protrusion which measures 3 mm in the AP dimension and contacts the traversing  left L3 nerve roots. Mild spinal canal stenosis, progressed. Mild bilateral  foraminal stenosis, progressed.    L3-L4: Listhesis. Mild disc bulge and osteophytic ridging. Bilateral facet  hypertrophy and ligamentum flavum infolding. Severe spinal canal stenosis,  progressed. Moderate bilateral foraminal stenosis, not significantly changed.    L4-L5: Listhesis. Mild disc bulge and osteophytic ridging. Bilateral facet  hypertrophy and ligamentum flavum infolding. Moderate spinal canal stenosis, not  significantly changed. Moderate right and severe left foraminal stenosis,  progressed on the left.    L5-S1: Mild disc bulge and osteophytic ridging with a stable superimposed broad,  shallow central disc protrusion which contacts the traversing right S1 nerve  roots. Bilateral facet hypertrophy. No spinal canal stenosis. Mild right and  moderate left foraminal stenosis, progressed on the left.    The posterior ligamentous structures and paraspinal musculature are normal in  signal.  Exam End: 01/11/24  6:25 PM    Specimen Collected: 01/12/24 11:41 AM Last Resulted: 01/12/24 12:13 PM   Received From[facility]  Result Received: 02/07/24 10:03 AM    View Encounter        Received Information        Imaging   XR spine lumbar minimum 4 views non injury  Status: Final result     PACS Images     Show images for XR spine lumbar minimum 4 views non injury  Study Result    Narrative & Impression         LUMBAR SPINE     INDICATION:   Pain, unspecified.      COMPARISON:  Comparison made with previous examination(s) dated (DX) 31-Mar-2023,(DX) 29-Mar-2023,(CT) 29-Mar-2023,(CT) 20-Mar-2023.     VIEWS:  XR SPINE LUMBAR MINIMUM 4 VIEWS NON INJURY  Images: 4     FINDINGS:     There  "are 5 non rib bearing lumbar vertebral bodies.      There is no evidence of acute fracture or destructive osseous lesion.     There is mild scoliosis concave to the left. No abnormal motion is seen on flexion and extension.     No significant lumbar degenerative change noted.     The pedicles appear intact.     Soft tissues are unremarkable.     IMPRESSION:        Mild scoliotic deformity with no evidence of acute osseous injury. There is no evidence of abnormal motion on flexion and extension.     Electronically signed: 02/07/2024 04:07 PM Prasad Frye MD     Imaging    XR spine lumbar minimum 4 views non injury (Order: [id number]) - 2/7/2024    Result History    XR spine lumbar minimum 4 views non injury (Order #[id number]) on 2/7/2024 - Order Result History Report    Order Report     Order Details  Order Questions    Question Answer   Test performed Elective- non urgent            Result Information    Status Priority Source   Final result (2/7/2024  4:07 PM) Routine      Reason for Exam    Dx: Pain [R52 (ICD-10-CM)]       All Reviewers List    Della Carlson PA-C on 2/7/2024  6:07 PM         XR spine lumbar minimum 4 views non injury: Patient Communication     Add Comments   Add Notifications        Signed by    Signed Date/Time  Phone Pager   PRASAD FRYE 2/07/2024 16:07 [phone]        Exam Information    Status Exam Begun  Exam Ended  Performing Tech   Final [99] 2/07/2024 13:22 2/07/2024 13:29 Sancho Quiroga       Questionnaire          Question Answer Comment   1. When should the test be performed? Elective- non urgent          End Exam      IMAGING END EXAM XRAY    Question Answer Comment   1. Script Info/History/Comments: Chronic low back pain    2. Any additional comments the Radiologist needs to know?     3. Was the patient shielded? No    4. Was fluoro used? No    5. Fluoro time? Please type \"MINUTES\" or \"SECONDS\" following your time.     6. Were all the images in this exam " within the acceptable exposure index range as posted? Yes    7. If No, provide additional information why (ie which view or position, fixed or AEC, wall/table/tabletop, etc)     8. Number of images sent to PACS: 4    9. Was jewelry removed from the patient? No    10. Jewelry removed:           PATIENT EDUCATION    Question Answer Comment   1. Was the patient educated? Yes    2. Why was the patient not educated?              External Results Report    Open External Results Report      Encounter    View Encounter                     Patient Care Timeline    No data selected in time range    Pre-op Summary    Pre-op             Recovery Summary    Recovery                 Routing History    None             No orders to display       No orders of the defined types were placed in this encounter.

## 2024-02-27 NOTE — TELEPHONE ENCOUNTER
Scheduled patient for LESI 03/06/2024  Patient denies RX blood thinners/ NSAIDS  Nothing to eat or drink 1 hour prior to procedure  Needs to arrange transportation  Proper clothing for procedure  No vaccines 2 weeks prior or after procedure  If ill or place on antibiotics, please call to reschedule

## 2024-02-27 NOTE — H&P (VIEW-ONLY)
Assessment:  1. Degenerative disc disease, lumbar    2. Spinal stenosis of lumbar region with neurogenic claudication    3. Lumbar radiculopathy        Plan:    Mr. Jackson is a pleasant 73-year-old male significant past medical history of lung CA status postsurgical excision, hypothyroidism presents to [facility] for initial evaluation and referral from Dr. Hartley regarding lumbar spinal stenosis with neurogenic claudication.  During today's evaluation he is demonstrating clinical and diagnostic evidence of lumbar radiculopathy likely in relation to the multilevel central and foraminal narrowing most notable at L3-L4 and L4-L5.  He is not currently a surgical candidate and was opted towards interventional considerations prior to anything more invasive.  At this time interventional approaches will be beneficial and warranted and we will plan for an L5-S1 LESI under fluoroscopy guidance.  We did discuss extensively alternative approaches including transforaminal epidural steroid injection and patient did inquire about neuromodulation.  Advised him we would hold off on neuromodulation considerations due to the current pathology in his spine as well as pending results from the injections.  All questions answered, patient is agreeable with plan.  Complete risks and benefits including bleeding, infection, tissue reaction, nerve injury and allergic reaction were discussed. The approach was demonstrated using models and literature was provided. Verbal and written consent was obtained.  History of Present Illness:    Kahlil Jackson is a 73 y.o. male who presents to [facility] for initial evaluation of the above stated pain complaints. The patient has a past medical and chronic pain history as outlined in the assessment section. He was referred by Della Carlson, MEGAN  [address]  [place] [address]   Patient presents to [facility] for  initial evaluation regarding 4+ months duration of isolated low back pain with radiating symptoms into bilateral lower extremities.  Denies any significant inciting event or recent trauma.  Today reports moderate to severe pain rated 8 out of 10 and interfere with activities.  Pain is nearly constant 60 to 95% of the time that is present throughout the day and night.  Describes symptoms of shooting, sharp, throbbing pain.  Denies any significant lower extremity weakness or falls.  Requires a cane and wheelchair for ambulation at times.  Symptoms are worse with prayer, standing, walking.  Reports moderate relief with previous injections and physical therapy.  Denies smoking or marijuana use.  Admits to social alcohol use.  Currently using Motrin and recently tried oral steroids which did help.  Presents today for initial evaluation.  Review of Systems:    Review of Systems   Constitutional:  Negative for chills and fatigue.   HENT:  Negative for ear pain, mouth sores and sinus pressure.    Eyes:  Negative for pain, redness and visual disturbance.   Respiratory:  Positive for wheezing. Negative for shortness of breath.    Cardiovascular:  Negative for chest pain and palpitations.   Gastrointestinal:  Negative for abdominal pain and nausea.   Endocrine: Negative for polyphagia.   Musculoskeletal:  Positive for back pain and gait problem. Negative for arthralgias and neck pain.   Skin:  Negative for wound.   Neurological:  Positive for weakness. Negative for seizures.   Psychiatric/Behavioral:  Positive for sleep disturbance. Negative for dysphoric mood.            Past Medical History:   Diagnosis Date    Hyperlipidemia     Hypothyroid     Mass of left lung        Past Surgical History:   Procedure Laterality Date    BUNIONECTOMY Left     COLONOSCOPY      HERNIA REPAIR      TONSILLECTOMY         Family History   Problem Relation Age of Onset    Lung cancer Mother     Cancer Father     Thyroid disease unspecified  "Paternal Grandfather        Social History     Occupational History    Not on file   Tobacco Use    Smoking status: Former     Current packs/day: 0.00     Average packs/day: 1 pack/day for 30.0 years (30.0 ttl pk-yrs)     Types: Cigarettes     Start date: 1993     Quit date: 2023     Years since quittin.1    Smokeless tobacco: Current   Vaping Use    Vaping status: Never Used   Substance and Sexual Activity    Alcohol use: Yes     Alcohol/week: 3.0 standard drinks of alcohol     Types: 3 Shots of liquor per week    Drug use: Never    Sexual activity: Not on file         Current Outpatient Medications:     atorvastatin (LIPITOR) 20 mg tablet, Take by mouth, Disp: , Rfl:     cholecalciferol (VITAMIN D3) 1,000 units tablet, Take 2,000 Units by mouth daily, Disp: , Rfl:     levothyroxine 75 mcg tablet, Take 100 mcg by mouth daily, Disp: , Rfl:     methylPREDNISolone 4 MG tablet therapy pack, Use as directed on package. Do not take other anti-inflammatory (NSAID) medications while on Medrol dose dusty. You should not suddenly stop using these medications. Follow the instructions listed on the prescription about tapering your dose., Disp: 21 tablet, Rfl: 0    No Known Allergies    Physical Exam:    /78   Pulse 103   Ht 5' 7\" (1.702 m)   Wt 75.3 kg (166 lb)   BMI 26.00 kg/m²     Constitutional: normal, well developed, well nourished, alert, in no distress and non-toxic and no overt pain behavior.  Eyes: anicteric  HEENT: grossly intact  Neck: supple, symmetric, trachea midline and no masses   Pulmonary:even and unlabored  Cardiovascular:No edema or pitting edema present  Skin:Normal without rashes or lesions and well hydrated  Psychiatric:Mood and affect appropriate  Neurologic:Cranial Nerves II-XII grossly intact  Musculoskeletal:normal gait, tenderness to palpation bilateral lumbar paraspinals, decreased active and passive range of motion with lumbar flexion and extension limited by pain, MMT 5 out of " 5 bilateral lower extremities, sensation gross intact to light touch, DTRs within normal limits, positive straight leg raise in the supine position radicular pain into the left leg       [facility]  Outside Information  Results  MRI lumbar spine wo contrast (Order [id number])     MRI lumbar spine wo contrast  Order: [id number]  Impression    Impression:    1. Progression of spondylosis contributing to varying degrees of spinal canal  and foraminal stenosis as described above including severe spinal canal stenosis  at L3-L4 and severe left foraminal stenosis at L4-L5.    2. Stable grade 1 anterolisthesis of L3 over L4 and L4 over L5 which is most  likely related to facet arthropathy.            Workstation:[id number]  Narrative    History: Stenosis of lumbosacral spine    Procedure: MRI of the lumbar spine was obtained with the following sequences:  Sagittal T1, sagittal T2, sagittal STIR and axial T2 weighted images. No  intravenous contrast.    Comparison: MRI lumbar spine dated 9/29/2016 and 11/29/2016    Findings: For the purposes of this dictation, the lumbar vertebrae are labeled  from a caudal to cranial direction, the first vertebra with lumbar morphology is  labeled as L5.    Conus and lower thoracic cord: The conus terminates at the L2 level and is  unremarkable. The distal thoracic cord is normal in caliber and signal. There is  redundancy of the upper cauda equina nerve roots secondary to spinal canal  stenosis more inferiorly.    Marrow and Alignment: There is maintenance of the lumbar lordosis with stable 2  mm anterolisthesis of L3 over L4 and stable 4 mm anterolisthesis of L4 over L5.  The vertebral bodies are otherwise normal in height and alignment. Multilevel  degenerative endplate changes. No focal suspicious marrow signal abnormality.    Disc desiccation and mild disc space narrowing from L2-L3 through L4-L5 which  appears mildly progressed. Multilevel anterior endplate osteophyte  formation.    L1-L2: No disc herniation. No spinal canal or foraminal stenosis.    L2-L3: Mild disc bulge with a new superimposed broad left paracentral disc  protrusion which measures 3 mm in the AP dimension and contacts the traversing  left L3 nerve roots. Mild spinal canal stenosis, progressed. Mild bilateral  foraminal stenosis, progressed.    L3-L4: Listhesis. Mild disc bulge and osteophytic ridging. Bilateral facet  hypertrophy and ligamentum flavum infolding. Severe spinal canal stenosis,  progressed. Moderate bilateral foraminal stenosis, not significantly changed.    L4-L5: Listhesis. Mild disc bulge and osteophytic ridging. Bilateral facet  hypertrophy and ligamentum flavum infolding. Moderate spinal canal stenosis, not  significantly changed. Moderate right and severe left foraminal stenosis,  progressed on the left.    L5-S1: Mild disc bulge and osteophytic ridging with a stable superimposed broad,  shallow central disc protrusion which contacts the traversing right S1 nerve  roots. Bilateral facet hypertrophy. No spinal canal stenosis. Mild right and  moderate left foraminal stenosis, progressed on the left.    The posterior ligamentous structures and paraspinal musculature are normal in  signal.  Exam End: 01/11/24  6:25 PM    Specimen Collected: 01/12/24 11:41 AM Last Resulted: 01/12/24 12:13 PM   Received From: [facility]  Result Received: 02/07/24 10:03 AM    View Encounter        Received Information        Imaging   XR spine lumbar minimum 4 views non injury  Status: Final result     PACS Images     Show images for XR spine lumbar minimum 4 views non injury  Study Result    Narrative & Impression         LUMBAR SPINE     INDICATION:   Pain, unspecified.      COMPARISON:  Comparison made with previous examination(s) dated (DX) 31-Mar-2023,(DX) 29-Mar-2023,(CT) 29-Mar-2023,(CT) 20-Mar-2023.     VIEWS:  XR SPINE LUMBAR MINIMUM 4 VIEWS NON INJURY  Images: 4     FINDINGS:     There  "are 5 non rib bearing lumbar vertebral bodies.      There is no evidence of acute fracture or destructive osseous lesion.     There is mild scoliosis concave to the left. No abnormal motion is seen on flexion and extension.     No significant lumbar degenerative change noted.     The pedicles appear intact.     Soft tissues are unremarkable.     IMPRESSION:        Mild scoliotic deformity with no evidence of acute osseous injury. There is no evidence of abnormal motion on flexion and extension.     Electronically signed: 02/07/2024 04:07 PM Prasad Frye MD     Imaging    XR spine lumbar minimum 4 views non injury (Order: [id number]) - 2/7/2024    Result History    XR spine lumbar minimum 4 views non injury (Order #[id number]) on 2/7/2024 - Order Result History Report    Order Report     Order Details  Order Questions    Question Answer   Test performed Elective- non urgent            Result Information    Status Priority Source   Final result (2/7/2024  4:07 PM) Routine      Reason for Exam    Dx: Pain [R52 (ICD-10-CM)]       All Reviewers List    Della Carlson PA-C on 2/7/2024  6:07 PM         XR spine lumbar minimum 4 views non injury: Patient Communication     Add Comments   Add Notifications        Signed by    Signed Date/Time  Phone Pager   PRASAD FRYE 2/07/2024 16:07 [phone]        Exam Information    Status Exam Begun  Exam Ended  Performing Tech   Final [99] 2/07/2024 13:22 2/07/2024 13:29 Sancho Quiroga       Questionnaire          Question Answer Comment   1. When should the test be performed? Elective- non urgent          End Exam      IMAGING END EXAM XRAY    Question Answer Comment   1. Script Info/History/Comments: Chronic low back pain    2. Any additional comments the Radiologist needs to know?     3. Was the patient shielded? No    4. Was fluoro used? No    5. Fluoro time? Please type \"MINUTES\" or \"SECONDS\" following your time.     6. Were all the images in this exam " within the acceptable exposure index range as posted? Yes    7. If No, provide additional information why (ie which view or position, fixed or AEC, wall/table/tabletop, etc)     8. Number of images sent to PACS: 4    9. Was jewelry removed from the patient? No    10. Jewelry removed:           PATIENT EDUCATION    Question Answer Comment   1. Was the patient educated? Yes    2. Why was the patient not educated?              External Results Report    Open External Results Report      Encounter    View Encounter                     Patient Care Timeline    No data selected in time range    Pre-op Summary    Pre-op             Recovery Summary    Recovery                 Routing History    None             No orders to display       No orders of the defined types were placed in this encounter.

## 2024-02-29 DIAGNOSIS — M54.16 LUMBAR RADICULOPATHY: Primary | ICD-10-CM

## 2024-02-29 RX ORDER — GABAPENTIN 300 MG/1
300 CAPSULE ORAL 3 TIMES DAILY
Qty: 60 CAPSULE | Refills: 2 | Status: SHIPPED | OUTPATIENT
Start: 2024-02-29

## 2024-02-29 NOTE — TELEPHONE ENCOUNTER
Patient called in asking if there was any medication that Dr. Malhotra could prescribe to get him through til his procedure on 03/06? Patient is reporting increased pain. Please advise.

## 2024-02-29 NOTE — TELEPHONE ENCOUNTER
Gabapentin 300 mg twice a day sent to pharmacy  Please advise patient to start with 300 mg in the a.m. for 3 days and then titrate up to twice a day  Thank you

## 2024-03-06 ENCOUNTER — HOSPITAL ENCOUNTER (OUTPATIENT)
Facility: AMBULARY SURGERY CENTER | Age: 74
Setting detail: OUTPATIENT SURGERY
Discharge: HOME | End: 2024-03-06
Attending: PHYSICAL MEDICINE & REHABILITATION | Admitting: PHYSICAL MEDICINE & REHABILITATION
Payer: MEDICARE

## 2024-03-06 ENCOUNTER — APPOINTMENT (OUTPATIENT)
Dept: RADIOLOGY | Facility: HOSPITAL | Age: 74
End: 2024-03-06
Payer: MEDICARE

## 2024-03-06 VITALS
RESPIRATION RATE: 18 BRPM | SYSTOLIC BLOOD PRESSURE: 116 MMHG | OXYGEN SATURATION: 97 % | TEMPERATURE: 97.6 F | HEART RATE: 83 BPM | DIASTOLIC BLOOD PRESSURE: 95 MMHG

## 2024-03-06 PROBLEM — M54.16 LUMBAR RADICULOPATHY: Status: ACTIVE | Noted: 2024-03-06

## 2024-03-06 PROCEDURE — 62323 NJX INTERLAMINAR LMBR/SAC: CPT | Performed by: PHYSICAL MEDICINE & REHABILITATION

## 2024-03-06 PROCEDURE — NC001 PR NO CHARGE: Performed by: PHYSICAL MEDICINE & REHABILITATION

## 2024-03-06 RX ORDER — LIDOCAINE HYDROCHLORIDE 10 MG/ML
INJECTION, SOLUTION EPIDURAL; INFILTRATION; INTRACAUDAL; PERINEURAL AS NEEDED
Status: DISCONTINUED | OUTPATIENT
Start: 2024-03-06 | End: 2024-03-06 | Stop reason: HOSPADM

## 2024-03-06 RX ORDER — METHYLPREDNISOLONE ACETATE 80 MG/ML
INJECTION, SUSPENSION INTRA-ARTICULAR; INTRALESIONAL; INTRAMUSCULAR; SOFT TISSUE AS NEEDED
Status: DISCONTINUED | OUTPATIENT
Start: 2024-03-06 | End: 2024-03-06 | Stop reason: HOSPADM

## 2024-03-06 NOTE — DISCHARGE INSTRUCTIONS
Epidural Steroid Injection   WHAT YOU NEED TO KNOW:   An epidural steroid injection (ANGEL) is a procedure to inject steroid medicine into the epidural space. The epidural space is between your spinal cord and vertebrae. Steroids reduce inflammation and fluid buildup in your spine that may be causing pain. You may be given pain medicine along with the steroids.          ACTIVITY  Do not drive or operate machinery today.  No strenuous activity today - bending, lifting, etc.  You may resume normal activites starting tomorrow - start slowly and as tolerated.  You may shower today, but no tub baths or hot tubs.  You may have numbness for several hours from the local anesthetic. Please use caution and common sense, especially with weight-bearing activities.    CARE OF THE INJECTION SITE  If you have soreness or pain, apply ice to the area today (20 minutes on/20 minutes off).  Starting tomorrow, you may use warm, moist heat or ice if needed.  You may have an increase or change in your discomfort for 36-48 hours after your treatment.  Apply ice and continue with any pain medication you have been prescribed.  Notify the [facility] if you have any of the following: redness, drainage, swelling, headache, stiff neck or fever above 100°F.    SPECIAL INSTRUCTIONS  Our office will contact you in approximately 7 days for a progress report.    MEDICATIONS  Continue to take all routine medications.  Our office may have instructed you to hold some medications.    As no general anesthesia was used in today's procedure, you should not experience any side effects related to anesthesia.     If you are diabetic, the steroids used in today's injection may temporarily increase your blood sugar levels after the first few days after your injection. Please keep a close eye on your sugars and alert the doctor who manages your diabetes if your sugars are significantly high from your baseline or you are symptomatic.     If you have a  problem specifically related to your procedure, please call our office at [phone].  Problems not related to your procedure should be directed to your primary care physician.

## 2024-03-06 NOTE — OP NOTE
OPERATIVE REPORT  PATIENT NAME: Kahlil Jackson    :  1950  MRN: [id number]  Pt Location: [facility] MINOR/PAIN ROOM 01    SURGERY DATE: 3/6/2024    Surgeons and Role:     * Tk Malhotra DO - Primary    Preop Diagnosis:  Intervertebral disc disorder with radiculopathy of lumbosacral region [M51.17]    Post-Op Diagnosis Codes:     * Intervertebral disc disorder with radiculopathy of lumbosacral region [M51.17]    Procedure(s):  L5-S1 LUMBAR EPIDURAL STEROID INJECTION    Lumbar epidural  Indication:  Back and radiating leg pain  Preoperative diagnosis:  Lumbar radiculitis  Postoperative diagnosis:  Lumbar radiculitis    Procedure: Fluoroscopically-guided L5-S1 interlaminar epidural steroid injection under fluoroscopy    EBL:  none  Specimens:  not applicable    After discussing the risks, benefits, and alternatives to the procedure, the patient expressed understanding and wished to proceed.  The patient was brought to the fluoroscopy suite and placed in the prone position.  A procedural pause was conducted to verify:  correct patient identity, procedure to be performed and as applicable, correct side and site, correct patient position, and availability of implants, special equipment and special requirements.  After identifying the L5-S1 space fluoroscopically, the skin was sterilely prepped and draped in the usual fashion using Chloraprep skin prep.  The skin and subcutaneous tissues were anesthetized with 1% lidocaine.  Utilizing a loss of resistance technique and intermittent fluoroscopic guidance, a 3.5 inch 20-gauge Tuohy needle was advanced into the epidural space.  Proper needle positioning was confirmed using multiple fluoroscopic views.  After negative aspiration, Omnipaque 240 contrast was injected confirming epidural spread without evidence of intravascular or intrathecal spread.  A 4 ml solution consisting of 80 mg Depo-Medrol in sterile saline was injected slowly and incrementally into the epidural  space.  Following the injection the needle was withdrawn slightly and flushed with 1% buffered lidocaine as it was fully extracted.  The patient tolerated the procedure well and there were no apparent complications.  After appropriate observation, the patient was dismissed from the clinic in good condition under their own power.        SIGNATURE: Tk Malhotra, DO  DATE: March 6, 2024  TIME: 1:32 PM

## 2024-03-13 ENCOUNTER — TELEPHONE (OUTPATIENT)
Dept: PAIN MEDICINE | Facility: CLINIC | Age: 74
End: 2024-03-13

## 2024-03-13 NOTE — TELEPHONE ENCOUNTER
Pt reports slight improvement post inj    Pain level 8-9/10  Pt aware I will call next week for an update

## 2024-03-29 NOTE — TELEPHONE ENCOUNTER
Caller: barrett Guillory    Doctor: Roscoe    Reason for call: pt is currently having nerve pain since the injection. Pt stated the pain a lot when walking.  Pt is taking the gabapentin. Pain level currently a 7 this morning above 10.    Call back#: [phone]

## 2024-03-29 NOTE — TELEPHONE ENCOUNTER
WENDY  S/w Pt, Pt is S/P L5-S1 LESI on 03/06/24 with KW. Pt reports initial mild relief after injection, pain was stable with some improvement. Currently Pt reports pain is worse than prior to injection, C/o shooting pain throughout entire body this AM. Pt reports no relief with gabapentin, Pt taking 400mg Ibuprofen with some relief. RN advised if bowel/bladder issues, intractable pain or weakness/gait abnormality, should report to ER. Pt scheduled for F/u OV with KW on 04/04/24 for re-evaluation post injection. Pt verbalized understanding.

## 2024-04-03 NOTE — H&P (VIEW-ONLY)
Assessment:  1. Spinal stenosis of lumbar region with neurogenic claudication    2. Lumbar foraminal stenosis        Plan:  Mr. Jackson is a pleasant 73-year-old male presents to [facility] for follow-up and reevaluation regarding ongoing low back pain with radiating symptoms into the posterolateral legs.  During today's evaluation he continues to demonstrate lumbar radiculopathy that appears in the L5 dermatomal distribution on relief with L5-S1 interlaminar epidural steroid injection.  Given current MRI findings and continued pain would at least attempt interventional approaches 1 more time from a different approach and we will plan for bilateral L5-S1 TFESI under fluoroscopy guidance.  We did discuss his previous conversation with Dr. Hartley and we will continue to opt for surgical as a last resort.  Hopefully he obtains better relief with the transforaminal approach and will also start him on Cymbalta 30 mg daily and taper off of the gabapentin.  All questions answered, patient is agreeable with plan.    Complete risks and benefits including bleeding, infection, tissue reaction, nerve injury and allergic reaction were discussed. The approach was demonstrated using models and literature was provided. Verbal and written consent was obtained.    My impressions and treatment recommendations were discussed in detail with the patient who verbalized understanding and had no further questions.  Discharge instructions were provided. I personally saw and examined the patient and I agree with the above discussed plan of care.    Orders Placed This Encounter   Procedures    FL spine and pain procedure     Standing Status:   Future     Standing Expiration Date:   4/4/2028     Order Specific Question:   Reason for Exam:     Answer:   Bilateral L5-S1 TFESI     Order Specific Question:   Anticoagulant hold needed?     Answer:   No     New Medications Ordered This Visit   Medications    DULoxetine  (CYMBALTA) 30 mg delayed release capsule     Sig: Take 1 capsule (30 mg total) by mouth daily     Dispense:  30 capsule     Refill:  1       History of Present Illness:  Kahlil Jackson is a 73 y.o. male who presents for a follow up office visit in regards to Back Pain and Leg Pain (Bilateral/).   The patient’s current symptoms include low back pain with radiating symptoms into bilateral lower extremities currently rated 8-10 out of 10 and interfere with activities.  Pain is described as a constant shooting, pins-and-needles, sharp, throbbing pain that is worse in the morning in the evening.  Presents today for follow-up and reevaluation.      I have personally reviewed and/or updated the patient's past medical history, past surgical history, family history, social history, current medications, allergies, and vital signs today.     Review of Systems   Respiratory:  Negative for shortness of breath.    Cardiovascular:  Negative for chest pain.   Gastrointestinal:  Negative for constipation, diarrhea, nausea and vomiting.   Musculoskeletal:  Positive for back pain, gait problem and joint swelling. Negative for arthralgias and myalgias.   Skin:  Negative for rash.   Neurological:  Negative for dizziness, seizures and weakness.   All other systems reviewed and are negative.      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 MAIN OR;  Service: Pain Management     HERNIA REPAIR       TONSILLECTOMY         Family History   Problem Relation Age of Onset    Lung cancer Mother     Cancer Father     Thyroid disease unspecified Paternal Grandfather        Social History     Occupational History    Not on file   Tobacco Use    Smoking status: Former     Current packs/day: 0.00     Average packs/day: 1 pack/day for 30.0 years (30.0 ttl pk-yrs)     Types: Cigarettes     Start date: 1993     Quit date: 2023     Years since quittin.2    Smokeless tobacco: Current   Vaping Use    Vaping status: Never Used   Substance and Sexual Activity    Alcohol use: Yes     Alcohol/week: 3.0 standard drinks of alcohol     Types: 3 Shots of liquor per week    Drug use: Never    Sexual activity: Not on file       Current Outpatient Medications on File Prior to Visit   Medication Sig    atorvastatin (LIPITOR) 20 mg tablet Take by mouth    cholecalciferol (VITAMIN D3) 1,000 units tablet Take 2,000 Units by mouth daily    gabapentin (NEURONTIN) 300 mg capsule Take 1 capsule (300 mg total) by mouth 3 (three) times a day    levothyroxine 75 mcg tablet Take 100 mcg by mouth daily     No current facility-administered medications on file prior to visit.       No Known Allergies    Physical Exam:    /81   Pulse 91     Constitutional:normal, well developed, well nourished, alert, in no distress and non-toxic and no overt pain behavior.  Eyes:anicteric  HEENT:grossly intact  Neck:supple, symmetric, trachea midline and no masses   Pulmonary:even and unlabored  Cardiovascular:No edema or pitting edema present  Skin:Normal without rashes or lesions and well hydrated  Psychiatric:Mood and affect appropriate  Neurologic:Cranial Nerves II-XII grossly intact  Musculoskeletal:normal    Imaging

## 2024-04-03 NOTE — PROGRESS NOTES
Assessment:  1. Spinal stenosis of lumbar region with neurogenic claudication    2. Lumbar foraminal stenosis        Plan:  Mr. Jackson is a pleasant 73-year-old male presents to [facility] for follow-up and reevaluation regarding ongoing low back pain with radiating symptoms into the posterolateral legs.  During today's evaluation he continues to demonstrate lumbar radiculopathy that appears in the L5 dermatomal distribution on relief with L5-S1 interlaminar epidural steroid injection.  Given current MRI findings and continued pain would at least attempt interventional approaches 1 more time from a different approach and we will plan for bilateral L5-S1 TFESI under fluoroscopy guidance.  We did discuss his previous conversation with Dr. Hartley and we will continue to opt for surgical as a last resort.  Hopefully he obtains better relief with the transforaminal approach and will also start him on Cymbalta 30 mg daily and taper off of the gabapentin.  All questions answered, patient is agreeable with plan.    Complete risks and benefits including bleeding, infection, tissue reaction, nerve injury and allergic reaction were discussed. The approach was demonstrated using models and literature was provided. Verbal and written consent was obtained.    My impressions and treatment recommendations were discussed in detail with the patient who verbalized understanding and had no further questions.  Discharge instructions were provided. I personally saw and examined the patient and I agree with the above discussed plan of care.    Orders Placed This Encounter   Procedures    FL spine and pain procedure     Standing Status:   Future     Standing Expiration Date:   4/4/2028     Order Specific Question:   Reason for Exam:     Answer:   Bilateral L5-S1 TFESI     Order Specific Question:   Anticoagulant hold needed?     Answer:   No     New Medications Ordered This Visit   Medications    DULoxetine  We are committed to providing our patients with their test results in a timely manner. If you have access to [facility], you will receive your results within 5 to 7 days. If your results are normal, a member of our office may call you or you may receive a letter in the mail within 10 to 15 days of your testing. If your results have something additional to discuss, a member of our office will contact you by phone. If at any time you have questions related your results, please feel free to call our office at [phone]   (CYMBALTA) 30 mg delayed release capsule     Sig: Take 1 capsule (30 mg total) by mouth daily     Dispense:  30 capsule     Refill:  1       History of Present Illness:  Kahlil Jackson is a 73 y.o. male who presents for a follow up office visit in regards to Back Pain and Leg Pain (Bilateral/).   The patient’s current symptoms include low back pain with radiating symptoms into bilateral lower extremities currently rated 8-10 out of 10 and interfere with activities.  Pain is described as a constant shooting, pins-and-needles, sharp, throbbing pain that is worse in the morning in the evening.  Presents today for follow-up and reevaluation.      I have personally reviewed and/or updated the patient's past medical history, past surgical history, family history, social history, current medications, allergies, and vital signs today.     Review of Systems   Respiratory:  Negative for shortness of breath.    Cardiovascular:  Negative for chest pain.   Gastrointestinal:  Negative for constipation, diarrhea, nausea and vomiting.   Musculoskeletal:  Positive for back pain, gait problem and joint swelling. Negative for arthralgias and myalgias.   Skin:  Negative for rash.   Neurological:  Negative for dizziness, seizures and weakness.   All other systems reviewed and are negative.      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    Other hyperlipidemia    Hemoptysis    Asthma    Pneumomediastinum (HCC)    Leukocytosis    Lumbar radiculopathy       Past Medical History:   Diagnosis Date    Hyperlipidemia     Hypothyroid     Mass of left lung        Past Surgical History:   Procedure Laterality Date    BUNIONECTOMY Left     COLONOSCOPY      EPIDURAL BLOCK INJECTION N/A 3/6/2024    Procedure: L5-S1 LUMBAR EPIDURAL STEROID INJECTION;  Surgeon: Tk Malhotra DO;  Location: [facility] MAIN OR;  Service: Pain Management     HERNIA REPAIR       TONSILLECTOMY         Family History   Problem Relation Age of Onset    Lung cancer Mother     Cancer Father     Thyroid disease unspecified Paternal Grandfather        Social History     Occupational History    Not on file   Tobacco Use    Smoking status: Former     Current packs/day: 0.00     Average packs/day: 1 pack/day for 30.0 years (30.0 ttl pk-yrs)     Types: Cigarettes     Start date: 1993     Quit date: 2023     Years since quittin.2    Smokeless tobacco: Current   Vaping Use    Vaping status: Never Used   Substance and Sexual Activity    Alcohol use: Yes     Alcohol/week: 3.0 standard drinks of alcohol     Types: 3 Shots of liquor per week    Drug use: Never    Sexual activity: Not on file       Current Outpatient Medications on File Prior to Visit   Medication Sig    atorvastatin (LIPITOR) 20 mg tablet Take by mouth    cholecalciferol (VITAMIN D3) 1,000 units tablet Take 2,000 Units by mouth daily    gabapentin (NEURONTIN) 300 mg capsule Take 1 capsule (300 mg total) by mouth 3 (three) times a day    levothyroxine 75 mcg tablet Take 100 mcg by mouth daily     No current facility-administered medications on file prior to visit.       No Known Allergies    Physical Exam:    /81   Pulse 91     Constitutional:normal, well developed, well nourished, alert, in no distress and non-toxic and no overt pain behavior.  Eyes:anicteric  HEENT:grossly intact  Neck:supple, symmetric, trachea midline and no masses   Pulmonary:even and unlabored  Cardiovascular:No edema or pitting edema present  Skin:Normal without rashes or lesions and well hydrated  Psychiatric:Mood and affect appropriate  Neurologic:Cranial Nerves II-XII grossly intact  Musculoskeletal:normal    Imaging

## 2024-04-04 ENCOUNTER — TELEPHONE (OUTPATIENT)
Dept: PAIN MEDICINE | Facility: CLINIC | Age: 74
End: 2024-04-04

## 2024-04-04 ENCOUNTER — OFFICE VISIT (OUTPATIENT)
Dept: PAIN MEDICINE | Facility: CLINIC | Age: 74
End: 2024-04-04
Payer: MEDICARE

## 2024-04-04 VITALS — SYSTOLIC BLOOD PRESSURE: 118 MMHG | HEART RATE: 91 BPM | DIASTOLIC BLOOD PRESSURE: 81 MMHG

## 2024-04-04 DIAGNOSIS — M48.061 LUMBAR FORAMINAL STENOSIS: ICD-10-CM

## 2024-04-04 DIAGNOSIS — M48.062 SPINAL STENOSIS OF LUMBAR REGION WITH NEUROGENIC CLAUDICATION: Primary | ICD-10-CM

## 2024-04-04 PROCEDURE — 99214 OFFICE O/P EST MOD 30 MIN: CPT | Performed by: PHYSICAL MEDICINE & REHABILITATION

## 2024-04-04 RX ORDER — DULOXETIN HYDROCHLORIDE 30 MG/1
30 CAPSULE, DELAYED RELEASE ORAL DAILY
Qty: 30 CAPSULE | Refills: 1 | Status: SHIPPED | OUTPATIENT
Start: 2024-04-04

## 2024-04-04 NOTE — TELEPHONE ENCOUNTER
Scheduled patient for TFESI 04/24/2024  Patient denies RX blood thinners/ NSAIDS  Nothing to eat or drink 1 hour prior to procedure  Needs to arrange transportation  Proper clothing for procedure  No vaccines 2 weeks prior or after procedure  If ill or place on antibiotics, please call to reschedule

## 2024-04-22 ENCOUNTER — HOSPITAL ENCOUNTER (OUTPATIENT)
Dept: CT IMAGING | Facility: HOSPITAL | Age: 74
Discharge: HOME | End: 2024-04-22
Payer: MEDICARE

## 2024-04-22 DIAGNOSIS — C34.90 MALIGNANT NEOPLASM OF BRONCHUS AND LUNG (HCC): ICD-10-CM

## 2024-04-22 PROCEDURE — 71260 CT THORAX DX C+: CPT

## 2024-04-22 PROCEDURE — 74177 CT ABD & PELVIS W/CONTRAST: CPT

## 2024-04-22 RX ADMIN — IOHEXOL 100 ML: 350 INJECTION, SOLUTION INTRAVENOUS at 18:33

## 2024-04-24 ENCOUNTER — APPOINTMENT (OUTPATIENT)
Dept: RADIOLOGY | Facility: HOSPITAL | Age: 74
End: 2024-04-24
Payer: MEDICARE

## 2024-04-24 ENCOUNTER — HOSPITAL ENCOUNTER (OUTPATIENT)
Facility: AMBULARY SURGERY CENTER | Age: 74
Setting detail: OUTPATIENT SURGERY
Discharge: HOME | End: 2024-04-24
Attending: PHYSICAL MEDICINE & REHABILITATION | Admitting: PHYSICAL MEDICINE & REHABILITATION
Payer: MEDICARE

## 2024-04-24 VITALS
HEART RATE: 83 BPM | RESPIRATION RATE: 18 BRPM | OXYGEN SATURATION: 97 % | SYSTOLIC BLOOD PRESSURE: 127 MMHG | TEMPERATURE: 96 F | DIASTOLIC BLOOD PRESSURE: 80 MMHG

## 2024-04-24 PROCEDURE — 64483 NJX AA&/STRD TFRM EPI L/S 1: CPT | Performed by: PHYSICAL MEDICINE & REHABILITATION

## 2024-04-24 PROCEDURE — NC001 PR NO CHARGE: Performed by: PHYSICAL MEDICINE & REHABILITATION

## 2024-04-24 RX ORDER — METHYLPREDNISOLONE ACETATE 40 MG/ML
INJECTION, SUSPENSION INTRA-ARTICULAR; INTRALESIONAL; INTRAMUSCULAR; SOFT TISSUE AS NEEDED
Status: DISCONTINUED | OUTPATIENT
Start: 2024-04-24 | End: 2024-04-24 | Stop reason: HOSPADM

## 2024-04-24 RX ORDER — LIDOCAINE HYDROCHLORIDE 10 MG/ML
INJECTION, SOLUTION EPIDURAL; INFILTRATION; INTRACAUDAL; PERINEURAL AS NEEDED
Status: DISCONTINUED | OUTPATIENT
Start: 2024-04-24 | End: 2024-04-24 | Stop reason: HOSPADM

## 2024-04-24 RX ORDER — BUPIVACAINE HYDROCHLORIDE 2.5 MG/ML
INJECTION, SOLUTION EPIDURAL; INFILTRATION; INTRACAUDAL AS NEEDED
Status: DISCONTINUED | OUTPATIENT
Start: 2024-04-24 | End: 2024-04-24 | Stop reason: HOSPADM

## 2024-04-24 NOTE — DISCHARGE INSTRUCTIONS
Epidural Steroid Injection   WHAT YOU NEED TO KNOW:   An epidural steroid injection (ANGEL) is a procedure to inject steroid medicine into the epidural space. The epidural space is between your spinal cord and vertebrae. Steroids reduce inflammation and fluid buildup in your spine that may be causing pain. You may be given pain medicine along with the steroids.          ACTIVITY  Do not drive or operate machinery today.  No strenuous activity today - bending, lifting, etc.  You may resume normal activites starting tomorrow - start slowly and as tolerated.  You may shower today, but no tub baths or hot tubs.  You may have numbness for several hours from the local anesthetic. Please use caution and common sense, especially with weight-bearing activities.    CARE OF THE INJECTION SITE  If you have soreness or pain, apply ice to the area today (20 minutes on/20 minutes off).  Starting tomorrow, you may use warm, moist heat or ice if needed.  You may have an increase or change in your discomfort for 36-48 hours after your treatment.  Apply ice and continue with any pain medication you have been prescribed.  Notify the [facility] if you have any of the following: redness, drainage, swelling, headache, stiff neck or fever above 100°F.    SPECIAL INSTRUCTIONS  Our office will contact you in approximately 7 days for a progress report.    MEDICATIONS  Continue to take all routine medications.  Our office may have instructed you to hold some medications.    As no general anesthesia was used in today's procedure, you should not experience any side effects related to anesthesia.     If you are diabetic, the steroids used in today's injection may temporarily increase your blood sugar levels after the first few days after your injection. Please keep a close eye on your sugars and alert the doctor who manages your diabetes if your sugars are significantly high from your baseline or you are symptomatic.     If you have a  problem specifically related to your procedure, please call our office at [phone].  Problems not related to your procedure should be directed to your primary care physician.

## 2024-04-24 NOTE — OP NOTE
OPERATIVE REPORT  PATIENT NAME: Kahlil Jackson    :  1950  MRN: [id number]  Pt Location: [facility] MINOR/PAIN ROOM 01    SURGERY DATE: 2024    Surgeons and Role:     * Tk Malhotra DO - Primary    Preop Diagnosis:  Lumbar radiculopathy [M54.16]    Post-Op Diagnosis Codes:     * Lumbar radiculopathy [M54.16]    Procedure(s):  Bilateral - BILATERAL L5-S1 TRANSFORAMINAL EPIDURAL STEROID INJECTION  Indication: Leg pain  Preoperative diagnosis: Lumbar radiculitis  Postoperative diagnosis: Lumbar radiculitis  Procedure: Fluoroscopically-guided bilateral L5-S1 transforaminal epidural steroid injection under fluoroscopy  EBL: none  Specimens: not applicable  After discussing the risks, benefits, and alternatives to the procedure, the patient expressed understanding and wished to proceed. The patient was brought to the fluoroscopy suite and placed in the prone position. A procedural pause was conducted to verify: correct patient identity, procedure to be performed and as applicable, correct side and site, correct patient position, and availability of implants, special equipment and special requirements. After identifying the right L5 pedicle fluoroscopically with an oblique view, the skin was sterilely prepped and draped in the usual fashion using Chloraprep skin prep. The skin and subcutaneous tissues were anesthetized with 1% lidocaine. A 3.5 inch 22-gauge  spinal needle was then advanced under fluoroscopic guidance to the neural foramen. Appropriate foraminal depth was determined with a lateral fluoroscopic view, and AP visualization confirmed needle positioning at approximately the 6 o'clock position relative to the pedicle. After negative aspiration, Omnipaque 240 contrast was injected using live fluoroscopy confirming appropriate transforaminal spread without evidence of intravascular or intrathecal uptake.  Next, a 1.5 ml solution consisting of 20mg depomedrol and 0.25% bupivacaine was injected slowly  and incrementally into the epidural space. Following the injection the needle was withdrawn slightly and flushed with lidocaine as it was fully extracted.  The procedure was then repeated in the exact same way on the opposite side at the same level.  The patient tolerated the procedure well and there were no apparent complications. The patient did not develop any new neurologic deficits. After appropriate observation, the patient was dismissed from the clinic in good condition under their own power.            SIGNATURE: Tk Malhotra,   DATE: April 24, 2024  TIME: 11:13 AM

## 2024-04-24 NOTE — INTERVAL H&P NOTE
H&P reviewed. After examining the patient I find no changes in the patients condition since the H&P had been written.    Vitals:    04/24/24 0958   BP: 114/75   Pulse: 97   Resp: 18   Temp: (!) 96 °F (35.6 °C)   SpO2: 97%

## 2024-04-27 DIAGNOSIS — M48.062 SPINAL STENOSIS OF LUMBAR REGION WITH NEUROGENIC CLAUDICATION: ICD-10-CM

## 2024-04-27 DIAGNOSIS — M48.061 LUMBAR FORAMINAL STENOSIS: ICD-10-CM

## 2024-04-28 RX ORDER — DULOXETIN HYDROCHLORIDE 30 MG/1
30 CAPSULE, DELAYED RELEASE ORAL DAILY
Qty: 90 CAPSULE | Refills: 1 | Status: SHIPPED | OUTPATIENT
Start: 2024-04-28

## 2024-05-01 ENCOUNTER — TELEPHONE (OUTPATIENT)
Dept: PAIN MEDICINE | Facility: CLINIC | Age: 74
End: 2024-05-01

## 2024-05-06 ENCOUNTER — OFFICE VISIT (OUTPATIENT)
Dept: OBGYN CLINIC | Facility: HOSPITAL | Age: 74
End: 2024-05-06
Payer: MEDICARE

## 2024-05-06 VITALS
DIASTOLIC BLOOD PRESSURE: 83 MMHG | SYSTOLIC BLOOD PRESSURE: 124 MMHG | HEART RATE: 98 BPM | WEIGHT: 166.01 LBS | HEIGHT: 67 IN | BODY MASS INDEX: 26.06 KG/M2

## 2024-05-06 DIAGNOSIS — M54.16 RADICULOPATHY, LUMBAR REGION: Primary | ICD-10-CM

## 2024-05-06 PROCEDURE — 99212 OFFICE O/P EST SF 10 MIN: CPT | Performed by: ORTHOPAEDIC SURGERY

## 2024-05-06 RX ORDER — METHOCARBAMOL 500 MG/1
500 TABLET, FILM COATED ORAL 3 TIMES DAILY
Qty: 30 TABLET | Refills: 2 | Status: SHIPPED | OUTPATIENT
Start: 2024-05-06 | End: 2024-06-05

## 2024-05-06 NOTE — PROGRESS NOTES
Assessment & Plan/Medical Decision Makin y.o. male with Back Pain, Bilateral Radicular Leg Pain, Ambulatory Dysfunction, and Neurogenic Claudication and imaging findings most notable for lumbar spondylosis , L3-4 spinal stenosis        The clinical, physical and imaging findings were reviewed with the patient.  Kahlil  has a constellation of findings consistent with Lumbar Radiculopathy and Neurogenic Claudication in the setting of lumbar degenerative disease.  Ongoing low back, gluteal and bilateral lower extremity pain with ambulatory dysfunction. Positive shopping cart sign.  Fortunately patient remains neurologically intact and functional. Physical exam showing decreased lumbar ROM.    We discussed the treatment options including physical therapy, at home exercises, activity modifications, chiropractic medicine, oral medications, interventional spine procedures.  At this time recommend continued conservative treatments. Did briefly discuss role of surgical intervention, however he would like to defer at this time as he is undergoing other medical treatments, including evaluation for a new abdominal hernia.     Recommend continuing to see pain management for evaluation and treatment.     Patient instructed to return to office/ER sooner if symptoms are not improving, getting worse, or new worrisome/neurologic symptoms arise.  Patient will follow up when he wishes to pursue surgical treatment.      Subjective:      Chief Complaint: Back Pain    HPI:  Kahlil Jackson is a 73 y.o. male presenting for initial visit with chief complaint of back and bilateral lower extremity pain. Ongoing back pain for years that has been worsening over the past few months. Reports pain across low back into bilateral gluteal region into anterior aspect of bilateral legs to level of ankles. Intermittent muscle cramping. Pain worse with prolonged standing and walking. Uses cane for ambulation. Only able to walk a few blocks before  needing to stop and rest before resuming. Improves with sitting/rest and leaning forward. Positive shopping cart sign. Pain and symptoms have been greatly interfering with his daily activites and functioning. Denies numbness/tingling or baudilio lower extremity weakness. Denies any baudilio trauma. Denies fever or chills, no night sweats. Denies any bladder or bowel changes.      PMH hypothyroidism, lung cancer s/p MARISOL sleeve lobectomy on 8/8/23 .    Conservative therapy includes the following:   Medications: ibuprofen    Injections: denies recent     Physical Therapy: denies recent, did PT back in 2016. Has been going to / twice a week recently to work on core and muscle strengthening exercises  Chiropractic Medicine: has not attempted  Accupunture/Massage Therapy: has not attempted   These therapeutic modalities were ineffective at providing sustained pain relief/functional improvement.     Nicotine dependent: denies  Occupation: n/a  Living situation: Lives with family   ADLs: patient is able to perform     Update 5/6/2024:  Presents as follow up. He reports his pain is moderately improved after his second round of injections by [facility]. The Medrol dose pack only minimally improved his pain. He described his symptoms today as central low back back pain that radiates down both legs anteriorly. He is taking duloxetine which improves his pain slightly. He was recently diagnosed with an abominal hernia.     Objective:     Family History   Problem Relation Age of Onset    Lung cancer Mother     Cancer Father     Thyroid disease unspecified Paternal Grandfather        Past Medical History:   Diagnosis Date    Hyperlipidemia     Hypothyroid     Mass of left lung        Current Outpatient Medications   Medication Sig Dispense Refill    atorvastatin (LIPITOR) 20 mg tablet Take by mouth      cholecalciferol (VITAMIN D3) 1,000 units tablet Take 2,000 Units by mouth daily      DULoxetine  (CYMBALTA) 30 mg delayed release capsule TAKE 1 CAPSULE BY MOUTH EVERY DAY 90 capsule 1    levothyroxine 75 mcg tablet Take 100 mcg by mouth daily       No current facility-administered medications for this visit.       Past Surgical History:   Procedure Laterality Date    BUNIONECTOMY Left     COLONOSCOPY      EPIDURAL BLOCK INJECTION N/A 3/6/2024    Procedure: L5-S1 LUMBAR EPIDURAL STEROID INJECTION;  Surgeon: Tk Malhotra DO;  Location: [facility] MAIN OR;  Service: Pain Management     EPIDURAL BLOCK INJECTION Bilateral 2024    Procedure: BILATERAL L5-S1 TRANSFORAMINAL EPIDURAL STEROID INJECTION;  Surgeon: Tk Malhotra DO;  Location: [facility] MAIN OR;  Service: Pain Management     HERNIA REPAIR      TONSILLECTOMY         Social History     Socioeconomic History    Marital status: Single     Spouse name: Not on file    Number of children: Not on file    Years of education: Not on file    Highest education level: Not on file   Occupational History    Not on file   Tobacco Use    Smoking status: Former     Current packs/day: 0.00     Average packs/day: 1 pack/day for 30.0 years (30.0 ttl pk-yrs)     Types: Cigarettes     Start date: 1993     Quit date: 2023     Years since quittin.3    Smokeless tobacco: Current   Vaping Use    Vaping status: Never Used   Substance and Sexual Activity    Alcohol use: Yes     Alcohol/week: 3.0 standard drinks of alcohol     Types: 3 Shots of liquor per week    Drug use: Never    Sexual activity: Not on file   Other Topics Concern    Not on file   Social History Narrative    Not on file     Social Determinants of Health     Financial Resource Strain: Low Risk  (2023)    Received from [facility] ([facility]    Overall Financial Resource Strain (CARDIA)     Difficulty of Paying Living Expenses: Not hard at all   Food Insecurity: No Food Insecurity (2023)    Received from [facility] ([facility]  "   Hunger Vital Sign     Worried About Running Out of Food in the Last Year: Never true     Ran Out of Food in the Last Year: Never true   Transportation Needs: No Transportation Needs (8/8/2023)    Received from [facility] ([facility]    PRAPARE - Transportation     Lack of Transportation (Medical): No     Lack of Transportation (Non-Medical): No   Physical Activity: Not on file   Stress: Not on file   Social Connections: Not on file   Intimate Partner Violence: Not on file   Housing Stability: Low Risk  (7/21/2023)    Received from [facility] ([facility]/)    Housing Stability     What is your living situation today?: I have a steady place to live     Housing Stability: Not on file       No Known Allergies    Review of Systems  General- denies fever/chills  HEENT- denies hearing loss or sore throat  Eyes- denies eye pain or visual disturbances, denies red eyes  Respiratory- denies cough or SOB  Cardio- denies chest pain or palpitations  GI- denies abdominal pain  Endocrine- denies urinary frequency  Urinary- denies pain with urination  Musculoskeletal- Negative except noted above  Skin- denies rashes or wounds  Neurological- denies dizziness or headache  Psychiatric- denies anxiety or difficulty concentrating    Physical Exam  /83   Pulse 98   Ht 5' 7\" (1.702 m)   Wt 75.3 kg (166 lb 0.1 oz)   BMI 26.00 kg/m²     General/Constitutional: No apparent distress: well-nourished and well developed.  Lymphatic: No appreciable lymphadenopathy  Respiratory: Non-labored breathing  Vascular: No edema, swelling or tenderness, except as noted in detailed exam.  Integumentary: No impressive skin lesions present, except as noted in detailed exam.  Psych: Normal mood and affect, oriented to person, place and time.  MSK: normal other than stated in HPI and exam  Gait & balance: no evidence of myelopathic gait, ambulates with a Cane    Lumbar spine range of " "motion:  -Forward flexion to 90  -Extension to neutral  -Lateral bend 25 right, 25 left  -Rotation 25 right, 25 left  There tenderness with palpation along lumbar paraspinal musculature, no midline tenderness     Neurologic:    Lower Extremity Motor Function    Right  Left    Iliopsoas  5/5  5/5    Quadriceps 5/5 5/5   Tibialis anterior  5/5  5/5    EHL  5/5  5/5    Gastroc. muscle  5/5  5/5    Heel rise  5/5  5/5    Toe rise  5/5  5/5      Sensory: light touch is intact to bilateral upper and lower extremities     Reflexes:    Right Left   Patellar 1+ 1+   Achilles 1+ 1+   Babinski neg neg     Other tests:  Straight Leg Raise: negative  Eric SI: negative  ANETTE SI: negative  Greater troch: no tenderness   Internal/external hip ROM: intact, no pain   Flexion/extension knee ROM: intact, no pain   Vascular: WWP extremities, 2+DP bilateral      Diagnostic Tests   IMAGING: I have personally reviewed the images and these are my findings:  Lumbar Spine X-rays from 2/7/2024: multi level lumbar spondylosis with loss of disc height, osteophyte formation and facet hypertrophy, no apparent spondylolisthesis, no appreciated lytic/blastic lesions, no obvious instability    Lumbar Spine MRI from 1/11/2024: multi level lumbar disc degeneration with disc desiccation, loss of disc height, facet and ligamentum hypertrophy, L3-4 central, lateral recess, foraminal stenosis     Electronic Medical Records were reviewed including office notes, imaging studies    Procedures, if performed today     None performed       Portions of the record may have been created with voice recognition software.  Occasional wrong word or \"sound a like\" substitutions may have occurred due to the inherent limitations of voice recognition software.  Read the chart carefully and recognize, using context, where substitutions have occurred.  "

## 2024-05-06 NOTE — PROGRESS NOTES
Assessment & Plan/Medical Decision Makin y.o. male with Back Pain, Bilateral Radicular Leg Pain, Ambulatory Dysfunction, and Neurogenic Claudication and imaging findings most notable for lumbar spondylosis , L3-4 spinal stenosis        The clinical, physical and imaging findings were reviewed with the patient.  Kahlil  has a constellation of findings consistent with Lumbar Radiculopathy and Neurogenic Claudication in the setting of lumbar degenerative disease.  Ongoing low back, gluteal and bilateral lower extremity pain with ambulatory dysfunction. Positive shopping cart sign.  Fortunately patient remains neurologically intact and functional. Physical exam showing decreased lumbar ROM.  We discussed the treatment options including physical therapy, at home exercises, activity modifications, chiropractic medicine, oral medications, interventional spine procedures.  At this time recommend continued conservative treatments. Did briefly discuss role of surgical intervention, however would recommend patient exhaust all conservative treatment first.  Referral to pain management for evaluation and treatment. Discussed potential role of steroid injection at or near the source of pain to provide targeted relief.  Medrol dose dusty rx sent to patient's pharmacy. Discussed reasoning for prescribing medication, proper usage, and potential side effects.  Patient instructed to return to office/ER sooner if symptoms are not improving, getting worse, or new worrisome/neurologic symptoms arise.  Patient will follow up in about 3 months for re-evaluation.       Subjective:      Chief Complaint: Back Pain    HPI:  Kahlil Jackson is a 73 y.o. male presenting for initial visit with chief complaint of back and bilateral lower extremity pain. Ongoing back pain for years that has been worsening over the past few months. Reports pain across low back into bilateral gluteal region into anterior aspect of bilateral legs to level of  ankles. Intermittent muscle cramping. Pain worse with prolonged standing and walking. Uses cane for ambulation. Only able to walk a few blocks before needing to stop and rest before resuming. Improves with sitting/rest and leaning forward. Positive shopping cart sign. Pain and symptoms have been greatly interfering with his daily activites and functioning. Denies numbness/tingling or baudilio lower extremity weakness. Denies any baudilio trauma. Denies fever or chills, no night sweats. Denies any bladder or bowel changes.      PMH hypothyroidism, lung cancer s/p MARISOL sleeve lobectomy on 8/8/23 .    Conservative therapy includes the following:   Medications: ibuprofen    Injections: denies recent     Physical Therapy: denies recent, did PT back in 2016. Has been going to / twice a week recently to work on core and muscle strengthening exercises  Chiropractic Medicine: has not attempted  Accupunture/Massage Therapy: has not attempted   These therapeutic modalities were ineffective at providing sustained pain relief/functional improvement.     Nicotine dependent: denies  Occupation: n/a  Living situation: Lives with family   ADLs: patient is able to perform     Update on 5/6/2024:  Kahlil is here for follow up. He recently underwent     Objective:     Family History   Problem Relation Age of Onset   • Lung cancer Mother    • Cancer Father    • Thyroid disease unspecified Paternal Grandfather        Past Medical History:   Diagnosis Date   • Hyperlipidemia    • Hypothyroid    • Mass of left lung        Current Outpatient Medications   Medication Sig Dispense Refill   • atorvastatin (LIPITOR) 20 mg tablet Take by mouth     • cholecalciferol (VITAMIN D3) 1,000 units tablet Take 2,000 Units by mouth daily     • DULoxetine (CYMBALTA) 30 mg delayed release capsule TAKE 1 CAPSULE BY MOUTH EVERY DAY 90 capsule 1   • levothyroxine 75 mcg tablet Take 100 mcg by mouth daily       No current  facility-administered medications for this visit.       Past Surgical History:   Procedure Laterality Date   • BUNIONECTOMY Left    • COLONOSCOPY     • EPIDURAL BLOCK INJECTION N/A 3/6/2024    Procedure: L5-S1 LUMBAR EPIDURAL STEROID INJECTION;  Surgeon: Tk Malhotra DO;  Location: [facility] MAIN OR;  Service: Pain Management    • EPIDURAL BLOCK INJECTION Bilateral 2024    Procedure: BILATERAL L5-S1 TRANSFORAMINAL EPIDURAL STEROID INJECTION;  Surgeon: Tk Malhotra DO;  Location: [facility] MAIN OR;  Service: Pain Management    • HERNIA REPAIR     • TONSILLECTOMY         Social History     Socioeconomic History   • Marital status: Single     Spouse name: Not on file   • Number of children: Not on file   • Years of education: Not on file   • Highest education level: Not on file   Occupational History   • Not on file   Tobacco Use   • Smoking status: Former     Current packs/day: 0.00     Average packs/day: 1 pack/day for 30.0 years (30.0 ttl pk-yrs)     Types: Cigarettes     Start date: 1993     Quit date: 2023     Years since quittin.3   • Smokeless tobacco: Current   Vaping Use   • Vaping status: Never Used   Substance and Sexual Activity   • Alcohol use: Yes     Alcohol/week: 3.0 standard drinks of alcohol     Types: 3 Shots of liquor per week   • Drug use: Never   • Sexual activity: Not on file   Other Topics Concern   • Not on file   Social History Narrative   • Not on file     Social Determinants of Health     Financial Resource Strain: Low Risk  (2023)    Received from [facility] ([facility]/)    Overall Financial Resource Strain (CARDIA)    • Difficulty of Paying Living Expenses: Not hard at all   Food Insecurity: No Food Insecurity (2023)    Received from [facility] ([facility]/)    Hunger Vital Sign    • Worried About Running Out of Food in the Last Year: Never true    • Ran Out of Food in the Last Year: Never true  "  Transportation Needs: No Transportation Needs (8/8/2023)    Received from [facility] ([facility]/)    PRAPARE - Transportation    • Lack of Transportation (Medical): No    • Lack of Transportation (Non-Medical): No   Physical Activity: Not on file   Stress: Not on file   Social Connections: Not on file   Intimate Partner Violence: Not on file   Housing Stability: Low Risk  (7/21/2023)    Received from [facility] ([facility]/)    Housing Stability    • What is your living situation today?: I have a steady place to live    • Housing Stability: Not on file       No Known Allergies    Review of Systems  General- denies fever/chills  HEENT- denies hearing loss or sore throat  Eyes- denies eye pain or visual disturbances, denies red eyes  Respiratory- denies cough or SOB  Cardio- denies chest pain or palpitations  GI- denies abdominal pain  Endocrine- denies urinary frequency  Urinary- denies pain with urination  Musculoskeletal- Negative except noted above  Skin- denies rashes or wounds  Neurological- denies dizziness or headache  Psychiatric- denies anxiety or difficulty concentrating    Physical Exam  /83   Pulse 98   Ht 5' 7\" (1.702 m)   Wt 75.3 kg (166 lb 0.1 oz)   BMI 26.00 kg/m²     General/Constitutional: No apparent distress: well-nourished and well developed.  Lymphatic: No appreciable lymphadenopathy  Respiratory: Non-labored breathing  Vascular: No edema, swelling or tenderness, except as noted in detailed exam.  Integumentary: No impressive skin lesions present, except as noted in detailed exam.  Psych: Normal mood and affect, oriented to person, place and time.  MSK: normal other than stated in HPI and exam  Gait & balance: no evidence of myelopathic gait, ambulates with a Cane    Lumbar spine range of motion:  -Forward flexion to 90  -Extension to neutral  -Lateral bend 25 right, 25 left  -Rotation 25 right, 25 left  There tenderness with " "palpation along lumbar paraspinal musculature, no midline tenderness     Neurologic:    Lower Extremity Motor Function    Right  Left    Iliopsoas  5/5  5/5    Quadriceps 5/5 5/5   Tibialis anterior  5/5  5/5    EHL  5/5  5/5    Gastroc. muscle  5/5  5/5    Heel rise  5/5  5/5    Toe rise  5/5  5/5      Sensory: light touch is intact to bilateral upper and lower extremities     Reflexes:    Right Left   Patellar 1+ 1+   Achilles 1+ 1+   Babinski neg neg     Other tests:  Straight Leg Raise: negative  Eric SI: negative  ANETTE SI: negative  Greater troch: no tenderness   Internal/external hip ROM: intact, no pain   Flexion/extension knee ROM: intact, no pain   Vascular: WWP extremities, 2+DP bilateral      Diagnostic Tests   IMAGING: I have personally reviewed the images and these are my findings:  Lumbar Spine X-rays from 2/7/2024: multi level lumbar spondylosis with loss of disc height, osteophyte formation and facet hypertrophy, no apparent spondylolisthesis, no appreciated lytic/blastic lesions, no obvious instability    Lumbar Spine MRI from 1/11/2024: multi level lumbar disc degeneration with disc desiccation, loss of disc height, facet and ligamentum hypertrophy, L3-4 central, lateral recess, foraminal stenosis     Electronic Medical Records were reviewed including office notes, imaging studies    Procedures, if performed today     None performed       Portions of the record may have been created with voice recognition software.  Occasional wrong word or \"sound a like\" substitutions may have occurred due to the inherent limitations of voice recognition software.  Read the chart carefully and recognize, using context, where substitutions have occurred.  "

## 2024-06-05 ENCOUNTER — TELEPHONE (OUTPATIENT)
Dept: PAIN MEDICINE | Facility: CLINIC | Age: 74
End: 2024-06-05

## 2024-06-05 ENCOUNTER — TELEPHONE (OUTPATIENT)
Age: 74
End: 2024-06-05

## 2024-06-05 DIAGNOSIS — M48.062 SPINAL STENOSIS OF LUMBAR REGION WITH NEUROGENIC CLAUDICATION: Primary | ICD-10-CM

## 2024-06-05 RX ORDER — PREGABALIN 75 MG/1
75 CAPSULE ORAL 2 TIMES DAILY
Qty: 60 CAPSULE | Refills: 1 | Status: SHIPPED | OUTPATIENT
Start: 2024-06-05

## 2024-06-05 NOTE — TELEPHONE ENCOUNTER
S/w pt. C/o increased lower back pain radiating down both legs. States he did get relief from 4/24 TFESI but it recently wore off and yesterday pain was severe. Pt states he is not currently taking anything for pain. Pt states he stopped cymbalta because it was not effective. Pt states he never filled methocarbamol because he was unaware ortho sent it to pharmacy.  Pt asking for recs from  today

## 2024-06-05 NOTE — TELEPHONE ENCOUNTER
S/w pt and advised same. Advised of possible side effects    Pt is asking if KW thinks he might be a candidate for a SCS?

## 2024-06-05 NOTE — TELEPHONE ENCOUNTER
Caller: Kahlil    Doctor: Roscoe    Reason for call: patient is in extreme pain unable to get up and walk around like normal he is only ok sitting would like a call back with recommendation.     Call back#: [phone]

## 2024-06-05 NOTE — TELEPHONE ENCOUNTER
Caller: Kahlil    Doctor: Roscoe    Reason for call: returning nurses phone call    Call back#: [phone]

## 2024-06-05 NOTE — TELEPHONE ENCOUNTER
PA for PREGABALIN    Submitted via    []CMM-KEY   [x][employer]spot-Case ID # Case ID: [id number]   []Faxed to plan   []Other website   []Phone call Case ID #     Office notes sent, clinical questions answered. Awaiting determination    Turnaround time for your insurance to make a decision on your Prior Authorization can take 7-21 business days.

## 2024-06-05 NOTE — TELEPHONE ENCOUNTER
Lyrcia 75mg BID sent to pharmacy  He has trialed gabapentin in the past but would give this a shot  He has severe spinal stenosis and believe he will need to proced with surgical intervention in the future  Thank you

## 2024-06-05 NOTE — TELEPHONE ENCOUNTER
Due to the severity of stenosis I do not believe he would be an ideal candidate for spinal cord stimulator unfortunately  Thank you

## 2024-06-06 NOTE — TELEPHONE ENCOUNTER
PA for PREGABALIN Approved     Date(s) approved UNTIL 06/05/2025        Patient advised by          [] MyChart Message  [] Phone call   [x]LMOM  []L/M to call office as no active Communication consent on file  []Unable to leave detailed message as VM not approved on Communication consent       Pharmacy advised by    [x]Fax  []Phone call    Approval letter scanned into Media Yes

## 2024-06-26 ENCOUNTER — OFFICE VISIT (OUTPATIENT)
Dept: GASTROENTEROLOGY | Facility: CLINIC | Age: 74
End: 2024-06-26
Payer: MEDICARE

## 2024-06-26 VITALS
BODY MASS INDEX: 26.84 KG/M2 | WEIGHT: 171 LBS | SYSTOLIC BLOOD PRESSURE: 124 MMHG | HEIGHT: 67 IN | DIASTOLIC BLOOD PRESSURE: 72 MMHG | TEMPERATURE: 97.4 F

## 2024-06-26 DIAGNOSIS — Z12.11 SCREENING FOR COLON CANCER: ICD-10-CM

## 2024-06-26 DIAGNOSIS — K42.9 UMBILICAL HERNIA WITHOUT OBSTRUCTION AND WITHOUT GANGRENE: Primary | ICD-10-CM

## 2024-06-26 PROCEDURE — 99204 OFFICE O/P NEW MOD 45 MIN: CPT | Performed by: PHYSICIAN ASSISTANT

## 2024-06-26 NOTE — PROGRESS NOTES
"[facility] - Outpatient Consultation New Patient  Kahlil Jackson 73 y.o. male MRN: [id number]  Encounter: [id number]    ASSESSMENT AND PLAN:    1. Umbilical hernia without obstruction and without gangrene  Patient with moderate sized umbilical hernia.  Hernia is reducible.  There is associated generalized abdominal discomfort.  Patient is interested in having this repaired.  Will refer to general surgery for further evaluation and opinion    - Ambulatory Referral to General Surgery; Future    2. Screening for colon cancer  Patient is due for colorectal cancer screening.  We discussed options.  He is considered average risk for colon cancer.  He declines/refuses colonoscopy.  He agrees to undergoing Cologuard stool testing.  He understands that should this return positive he needs to undergo colonoscopy.  All questions answered.    - Cologuard    Patient was instructed to call the office with any questions, concerns, new/ worsening/ persisting GI symptoms. Advised patient go to the ER with any severe or worsening abdominal pain, fevers/ chills, intractable N/V, chest pain, SOB, dizziness, lightheadedness. Patient expressed understanding and is in agreement with treatment plan.     Will plan to follow-up as needed  ________________________________________________________    HPI:      Patient is new to me.  Patient with a past medical history of lung mass, hyperlipidemia presents to the office today to discuss concerns over hernia.    Patient reports feeling well overall.  He complains of a hernia at his umbilicus which he noticed a few months ago.  He states that it \" pops out\" at times.  He is able to push the hernia back in.  The hernia has not changed significantly in terms of size.  With this hernia he can have associated generalized abdominal discomfort.  Otherwise he is eating and drinking well.  BM's are regular and stool is brown and formed.   Patient denies any fevers/ chills, " unintentional weight loss, decreased appetite, nausea, vomiting, change in bowel habits, diarrhea, constipation, black or bloody stools, heartburn, dysphagia, odynophagia.   Denies chest pain, SOB    Last colonoscopy was over 10 years ago, per patient normal, I do not have this report to review at the time of visit today  Patient denies family history of colon cancer or colon polyps.       REVIEW OF SYSTEMS:    Review of Systems   Constitutional:  Negative for chills and fever.   HENT:  Negative for ear pain and sore throat.    Eyes:  Negative for pain and visual disturbance.   Respiratory:  Negative for cough and shortness of breath.    Cardiovascular:  Negative for chest pain and palpitations.   Gastrointestinal:  Negative for constipation, diarrhea, nausea and vomiting.   Genitourinary:  Negative for dysuria, frequency and hematuria.   Musculoskeletal:  Negative for arthralgias, back pain and myalgias.   Skin:  Negative for color change and rash.   Neurological:  Negative for seizures, syncope and headaches.   All other systems reviewed and are negative.       Historical Information   Past Medical History:   Diagnosis Date    Cancer (HCC) 03/2023    Hyperlipidemia     Hypothyroid     Mass of left lung      Past Surgical History:   Procedure Laterality Date    BUNIONECTOMY Left     COLONOSCOPY      EPIDURAL BLOCK INJECTION N/A 3/6/2024    Procedure: L5-S1 LUMBAR EPIDURAL STEROID INJECTION;  Surgeon: Tk Malhotra DO;  Location: [facility] MAIN OR;  Service: Pain Management     EPIDURAL BLOCK INJECTION Bilateral 4/24/2024    Procedure: BILATERAL L5-S1 TRANSFORAMINAL EPIDURAL STEROID INJECTION;  Surgeon: Tk Malhotra DO;  Location: [facility] MAIN OR;  Service: Pain Management     HERNIA REPAIR      TONSILLECTOMY       Social History   Social History     Substance and Sexual Activity   Alcohol Use Yes    Alcohol/week: 4.0 standard drinks of alcohol    Types: 2 Glasses of wine, 2 Standard drinks or equivalent  per week     Social History     Substance and Sexual Activity   Drug Use Never     Social History     Tobacco Use   Smoking Status Former    Current packs/day: 0.00    Average packs/day: 1 pack/day for 30.0 years (30.0 ttl pk-yrs)    Types: Cigarettes    Start date: 1993    Quit date: 2023    Years since quittin.4   Smokeless Tobacco Never     Family History   Problem Relation Age of Onset    Lung cancer Mother     Cancer Mother             Cancer Father             Thyroid disease unspecified Paternal Grandfather     Diabetes Maternal Grandmother             Cancer Paternal Grandmother                Meds/Allergies       Current Outpatient Medications:     atorvastatin (LIPITOR) 20 mg tablet    cholecalciferol (VITAMIN D3) 1,000 units tablet    levothyroxine 75 mcg tablet    pregabalin (LYRICA) 75 mg capsule    DULoxetine (CYMBALTA) 30 mg delayed release capsule    methocarbamol (ROBAXIN) 500 mg tablet    No Known Allergies        Objective   Wt Readings from Last 3 Encounters:   24 77.6 kg (171 lb)   24 75.3 kg (166 lb 0.1 oz)   24 75.3 kg (166 lb)     Temp Readings from Last 3 Encounters:   24 (!) 97.4 °F (36.3 °C) (Tympanic)   24 (!) 96 °F (35.6 °C) (Temporal)   24 97.6 °F (36.4 °C) (Skin)     BP Readings from Last 3 Encounters:   24 124/72   24 124/83   24 127/80     Pulse Readings from Last 3 Encounters:   24 98   24 83   24 91        PHYSICAL EXAM:     Physical Exam  Vitals reviewed.   Constitutional:       General: He is not in acute distress.     Appearance: He is well-developed. He is not ill-appearing or diaphoretic.   HENT:      Head: Normocephalic and atraumatic.   Eyes:      Conjunctiva/sclera: Conjunctivae normal.   Cardiovascular:      Rate and Rhythm: Normal rate and regular rhythm.      Heart sounds: No murmur heard.  Pulmonary:      Effort: Pulmonary effort is normal. No respiratory distress.       Breath sounds: Normal breath sounds.   Abdominal:      General: Bowel sounds are normal.      Palpations: Abdomen is soft.      Tenderness: There is no abdominal tenderness.      Hernia: A hernia (reducible, nontender, moderate size) is present.   Musculoskeletal:         General: No swelling.      Cervical back: Neck supple.   Skin:     General: Skin is warm and dry.      Capillary Refill: Capillary refill takes less than 2 seconds.   Neurological:      General: No focal deficit present.      Mental Status: He is alert and oriented to person, place, and time.   Psychiatric:         Mood and Affect: Mood normal.         Behavior: Behavior normal.             Lab Results:   No visits with results within 1 Day(s) from this visit.   Latest known visit with results is:   Hospital Outpatient Visit on 04/11/2023   Component Date Value    POC Glucose 04/11/2023 88        Lab Results   Component Value Date    WBC 11.12 (H) 03/30/2023    HGB 12.5 03/30/2023    HCT 37.5 03/30/2023    MCV 98 03/30/2023     03/30/2023       Lab Results   Component Value Date    SODIUM 142 02/05/2024    K 4.6 02/05/2024     02/05/2024    CO2 29 02/05/2024    AGAP 8 02/05/2024    BUN 19 02/05/2024    CREATININE 1.27 02/05/2024    GLUC 89 02/05/2024    GLUF 81 03/30/2023    CALCIUM 9.6 02/05/2024    AST 20 02/05/2024    ALT 12 02/05/2024    ALKPHOS 52 02/05/2024    TP 6.6 02/05/2024    TBILI 0.5 02/05/2024    EGFR 60 02/05/2024       Radiology Results:   Patient underwent CT chest abdomen and pelvis with IV contrast 4/22/2024 for indication of malignant neoplasm of bronchus or lung, this was reviewed, this showed evidence of postsurgical changes from resection of lung neoplasm without evidence of recurrence or metastasis.  Liver unremarkable.  Gallstones were seen.  Spleen, pancreas unremarkable.  Stomach and bowel unremarkable.  There is no ascites or lymphadenopathy.  A small umbilical hernia containing nonobstructive loop of small  bowel was seen.  Small fat-containing bilateral inguinal hernias also seen.      Bee Reynolds PA-C   Available on [employer]

## 2024-07-11 ENCOUNTER — CONSULT (OUTPATIENT)
Dept: SURGERY | Facility: CLINIC | Age: 74
End: 2024-07-11
Payer: MEDICARE

## 2024-07-11 VITALS
HEIGHT: 67 IN | TEMPERATURE: 96.8 F | OXYGEN SATURATION: 97 % | SYSTOLIC BLOOD PRESSURE: 110 MMHG | BODY MASS INDEX: 26.74 KG/M2 | HEART RATE: 92 BPM | WEIGHT: 170.4 LBS | DIASTOLIC BLOOD PRESSURE: 70 MMHG

## 2024-07-11 DIAGNOSIS — K42.9 UMBILICAL HERNIA WITHOUT OBSTRUCTION AND WITHOUT GANGRENE: ICD-10-CM

## 2024-07-11 PROCEDURE — 99203 OFFICE O/P NEW LOW 30 MIN: CPT | Performed by: SURGERY

## 2024-07-11 NOTE — PROGRESS NOTES
Patient ID: Kahlil Jackson is a 73 y.o. male Date of Birth 1950       Chief complaint umbilical hernia    Allergies:  Patient has no known allergies.    Diagnosis:  1. Umbilical hernia without obstruction and without gangrene  -     Ambulatory Referral to General Surgery     Diagnosis ICD-10-CM Associated Orders   1. Umbilical hernia without obstruction and without gangrene  K42.9 Ambulatory Referral to General Surgery             Subjective:   The patient is a 73-year-old male who has a umbilical hernia.  This is occasionally bothersome and at least 20 at the CAT scan he had a loop of bowel stuck in it.  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    Other hyperlipidemia    Hemoptysis    Asthma    Pneumomediastinum (HCC)    Leukocytosis    Lumbar radiculopathy     Past Medical History:   Diagnosis Date    Asthma 1960    Cancer (HCC) 03/2023    Hyperlipidemia     Hypothyroid     Mass of left lung      Past Surgical History:   Procedure Laterality Date    BUNIONECTOMY Left     COLONOSCOPY      EPIDURAL BLOCK INJECTION N/A 03/06/2024    Procedure: L5-S1 LUMBAR EPIDURAL STEROID INJECTION;  Surgeon: Tk Malhotra DO;  Location: [facility] MAIN OR;  Service: Pain Management     EPIDURAL BLOCK INJECTION Bilateral 04/24/2024    Procedure: BILATERAL L5-S1 TRANSFORAMINAL EPIDURAL STEROID INJECTION;  Surgeon: Tk Malhotra DO;  Location: [facility] MAIN OR;  Service: Pain Management     HERNIA REPAIR      approximately 30 years ago    TONSILLECTOMY       Social History     Socioeconomic History    Marital status: Single     Spouse name: None    Number of children: None    Years of education: None    Highest education level: None   Occupational History    None   Tobacco Use    Smoking status:  Former     Current packs/day: 0.00     Average packs/day: 1 pack/day for 30.0 years (30.0 ttl pk-yrs)     Types: Cigarettes     Start date: 1993     Quit date: 2023     Years since quittin.5    Smokeless tobacco: Never   Vaping Use    Vaping status: Never Used   Substance and Sexual Activity    Alcohol use: Yes     Alcohol/week: 4.0 standard drinks of alcohol     Types: 2 Glasses of wine, 2 Standard drinks or equivalent per week    Drug use: Never    Sexual activity: Not Currently     Partners: Female     Birth control/protection: Condom Male   Other Topics Concern    None   Social History Narrative    None     Social Determinants of Health     Financial Resource Strain: Low Risk  (2023)    [facility]    Overall Financial Resource Strain (CARDIA)     Difficulty of Paying Living Expenses: Not hard at all   Food Insecurity: No Food Insecurity (2023)    Received from [facility] ([facility]), [facility]    Hunger Vital Sign     Worried About Running Out of Food in the Last Year: Never true     Ran Out of Food in the Last Year: Never true   Transportation Needs: No Transportation Needs (2023)    Received from [facility] ([facility]    PRAPARE - Transportation     Lack of Transportation (Medical): No     Lack of Transportation (Non-Medical): No   Physical Activity: Not on file   Stress: Not on file   Social Connections: Not on file   Intimate Partner Violence: Not on file   Housing Stability: Low Risk  (2023)    Received from [facility]    Housing Stability     What is your living situation today?: I have a steady place to live      "Housing Stability: Not on file        Current Outpatient Medications:     atorvastatin (LIPITOR) 20 mg tablet, Take by mouth, Disp: , Rfl:     cholecalciferol (VITAMIN D3) 1,000 units tablet, Take 2,000 Units by mouth daily, Disp: , Rfl:     DULoxetine (CYMBALTA) 30 mg delayed release capsule, TAKE 1 CAPSULE BY MOUTH EVERY DAY, Disp: 90 capsule, Rfl: 1    levothyroxine 75 mcg tablet, Take 100 mcg by mouth daily, Disp: , Rfl:     pregabalin (LYRICA) 75 mg capsule, Take 1 capsule (75 mg total) by mouth 2 (two) times a day, Disp: 60 capsule, Rfl: 1    methocarbamol (ROBAXIN) 500 mg tablet, Take 1 tablet (500 mg total) by mouth 3 (three) times a day, Disp: 30 tablet, Rfl: 2  Family History   Problem Relation Age of Onset    Lung cancer Mother     Cancer Mother             Cancer Father             Thyroid disease unspecified Paternal Grandfather     Diabetes Maternal Grandmother             Cancer Paternal Grandmother               Review of Systems   Constitutional:         Covid 2-3 times, last time early   Had 3 shots   HENT:  Positive for hearing loss.         Taste diminished   Eyes:         Had cataract surgery   Respiratory:          S/p L lobectomy for ca   Smoked 30 yrs at 1 ppd  copd   Cardiovascular:         On a statin   Gastrointestinal:         Has gallstones  No colonoscopy lately   Endocrine:        Hypothyroid from Hashimoto's   Genitourinary: Negative.    Musculoskeletal:  Positive for back pain.   Skin: Negative.    Neurological: Negative.    Hematological: Negative.    Psychiatric/Behavioral: Negative.           Objective:  /70 (BP Location: Left arm, Patient Position: Sitting, Cuff Size: Standard)   Pulse 92   Temp (!) 96.8 °F (36 °C) (Tympanic)   Ht 5' 7\" (1.702 m)   Wt 77.3 kg (170 lb 6.4 oz)   SpO2 97%   BMI 26.69 kg/m²     Physical Exam  Vitals reviewed.   Constitutional:       Appearance: Normal appearance.   HENT:      Head: Normocephalic and atraumatic. " "  Eyes:      General: No scleral icterus.     Conjunctiva/sclera: Conjunctivae normal.   Cardiovascular:      Rate and Rhythm: Normal rate and regular rhythm.   Pulmonary:      Effort: Pulmonary effort is normal. No respiratory distress.      Breath sounds: No stridor. No wheezing, rhonchi or rales.      Comments: Some diminished breath sounds on the left consistent with the lobectomy  Abdominal:      General: There is no distension.      Palpations: Abdomen is soft. There is no mass.      Tenderness: There is no abdominal tenderness. There is no guarding.      Hernia: A hernia is present.      Comments: Visible, palpable and reducible umbilical hernia.  Defect is only about 2 cm   Musculoskeletal:         General: Normal range of motion.      Cervical back: Normal range of motion. No rigidity.   Skin:     Coloration: Skin is not jaundiced.   Neurological:      Mental Status: He is alert and oriented to person, place, and time.   Psychiatric:         Mood and Affect: Mood normal.         Behavior: Behavior normal.         Thought Content: Thought content normal.         Judgment: Judgment normal.               Robert Bloch, MD      Portions of the record may have been created with voice recognition software. Occasional wrong word or \"sound a like\" substitutions may have occurred due to the inherent limitations of voice recognition software. Read the chart carefully and recognize, using context, where substitutions have occurred.    "

## 2024-07-11 NOTE — LETTER
July 11, 2024     Roger Alex DO  [address] [facility]  [address] [place] [id number]    Patient: Kahlil Jackson   YOB: 1950   Date of Visit: 7/11/2024       Dear Dr. Alex:    Thank you for referring Kahlil Jackson to me for evaluation. Below are my notes for this consultation.    If you have questions, please do not hesitate to call me. I look forward to following your patient along with you.         Sincerely,        Robert Bloch, MD        CC: No Recipients    Robert Bloch, MD  7/11/2024 12:51 PM  Incomplete  Patient ID: Kahlil Jackson is a 73 y.o. male Date of Birth 1950       Chief complaint umbilical hernia    Allergies:  Patient has no known allergies.    Diagnosis:  1. Umbilical hernia without obstruction and without gangrene  -     Ambulatory Referral to General Surgery     Diagnosis ICD-10-CM Associated Orders   1. Umbilical hernia without obstruction and without gangrene  K42.9 Ambulatory Referral to General Surgery             Subjective:   The patient is a 73-year-old male who has a umbilical hernia.  This is occasionally bothersome and at least 20 at the CAT scan he had a loop of bowel stuck in it.  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   • Other hyperlipidemia   • Hemoptysis   • Asthma   • Pneumomediastinum (HCC)   • Leukocytosis   • Lumbar radiculopathy     Past Medical History:   Diagnosis Date   • Asthma 1960   • Cancer (HCC) 03/2023   • Hyperlipidemia    • Hypothyroid    • Mass of left lung      Past Surgical History:   Procedure Laterality Date   • BUNIONECTOMY Left    • COLONOSCOPY     • EPIDURAL BLOCK INJECTION N/A 03/06/2024    Procedure: L5-S1 LUMBAR EPIDURAL STEROID INJECTION;  Surgeon: Tk Malhotra DO;  Location: [facility] MAIN OR;  Service:  Pain Management    • EPIDURAL BLOCK INJECTION Bilateral 2024    Procedure: BILATERAL L5-S1 TRANSFORAMINAL EPIDURAL STEROID INJECTION;  Surgeon: Tk Malhotra DO;  Location: [facility] MAIN OR;  Service: Pain Management    • HERNIA REPAIR      approximately 30 years ago   • TONSILLECTOMY       Social History     Socioeconomic History   • Marital status: Single     Spouse name: None   • Number of children: None   • Years of education: None   • Highest education level: None   Occupational History   • None   Tobacco Use   • Smoking status: Former     Current packs/day: 0.00     Average packs/day: 1 pack/day for 30.0 years (30.0 ttl pk-yrs)     Types: Cigarettes     Start date: 1993     Quit date: 2023     Years since quittin.5   • Smokeless tobacco: Never   Vaping Use   • Vaping status: Never Used   Substance and Sexual Activity   • Alcohol use: Yes     Alcohol/week: 4.0 standard drinks of alcohol     Types: 2 Glasses of wine, 2 Standard drinks or equivalent per week   • Drug use: Never   • Sexual activity: Not Currently     Partners: Female     Birth control/protection: Condom Male   Other Topics Concern   • None   Social History Narrative   • None     Social Determinants of Health     Financial Resource Strain: Low Risk  (2023)    Received from [facility] ([facility]/), [facility] ([facility]    Overall Financial Resource Strain (CARDIA)    • Difficulty of Paying Living Expenses: Not hard at all   Food Insecurity: No Food Insecurity (2023)    Received from [facility] ([facility]), [facility] ([facility]/)    Hunger Vital Sign    • Worried About Running Out of Food in the Last Year: Never true    • Ran Out of Food in the Last Year: Never true   Transportation Needs: No Transportation Needs (2023)    Received from [facility] ([facility]/),  [facility] ([facility]    PRAPARE - Transportation    • Lack of Transportation (Medical): No    • Lack of Transportation (Non-Medical): No   Physical Activity: Not on file   Stress: Not on file   Social Connections: Not on file   Intimate Partner Violence: Not on file   Housing Stability: Low Risk  (2023)    Received from [facility] ([facility]/), [facility] ([facility]    Housing Stability    • What is your living situation today?: I have a steady place to live    • Housing Stability: Not on file        Current Outpatient Medications:   •  atorvastatin (LIPITOR) 20 mg tablet, Take by mouth, Disp: , Rfl:   •  cholecalciferol (VITAMIN D3) 1,000 units tablet, Take 2,000 Units by mouth daily, Disp: , Rfl:   •  DULoxetine (CYMBALTA) 30 mg delayed release capsule, TAKE 1 CAPSULE BY MOUTH EVERY DAY, Disp: 90 capsule, Rfl: 1  •  levothyroxine 75 mcg tablet, Take 100 mcg by mouth daily, Disp: , Rfl:   •  pregabalin (LYRICA) 75 mg capsule, Take 1 capsule (75 mg total) by mouth 2 (two) times a day, Disp: 60 capsule, Rfl: 1  •  methocarbamol (ROBAXIN) 500 mg tablet, Take 1 tablet (500 mg total) by mouth 3 (three) times a day, Disp: 30 tablet, Rfl: 2  Family History   Problem Relation Age of Onset   • Lung cancer Mother    • Cancer Mother            • Cancer Father            • Thyroid disease unspecified Paternal Grandfather    • Diabetes Maternal Grandmother            • Cancer Paternal Grandmother               Review of Systems   Constitutional:         Covid 2-3 times, last time early   Had 3 shots   HENT:  Positive for hearing loss.         Taste diminished   Eyes:         Had cataract surgery   Respiratory:          S/p L lobectomy for ca   Smoked 30 yrs at 1 ppd  copd   Cardiovascular:         On a statin   Gastrointestinal:         Has gallstones  No colonoscopy lately   Endocrine:        Hypothyroid  "from Hashimoto's   Genitourinary: Negative.    Musculoskeletal:  Positive for back pain.   Skin: Negative.    Neurological: Negative.    Hematological: Negative.    Psychiatric/Behavioral: Negative.           Objective:  /70 (BP Location: Left arm, Patient Position: Sitting, Cuff Size: Standard)   Pulse 92   Temp (!) 96.8 °F (36 °C) (Tympanic)   Ht 5' 7\" (1.702 m)   Wt 77.3 kg (170 lb 6.4 oz)   SpO2 97%   BMI 26.69 kg/m²     Physical Exam  Vitals reviewed.   Constitutional:       Appearance: Normal appearance.   HENT:      Head: Normocephalic and atraumatic.   Eyes:      General: No scleral icterus.     Conjunctiva/sclera: Conjunctivae normal.   Cardiovascular:      Rate and Rhythm: Normal rate and regular rhythm.   Pulmonary:      Effort: Pulmonary effort is normal. No respiratory distress.      Breath sounds: No stridor. No wheezing, rhonchi or rales.      Comments: Some diminished breath sounds on the left consistent with the lobectomy  Abdominal:      General: There is no distension.      Palpations: Abdomen is soft. There is no mass.      Tenderness: There is no abdominal tenderness. There is no guarding.      Hernia: A hernia is present.      Comments: Visible, palpable and reducible umbilical hernia.  Defect is only about 2 cm   Musculoskeletal:         General: Normal range of motion.      Cervical back: Normal range of motion. No rigidity.   Skin:     Coloration: Skin is not jaundiced.   Neurological:      Mental Status: He is alert and oriented to person, place, and time.   Psychiatric:         Mood and Affect: Mood normal.         Behavior: Behavior normal.         Thought Content: Thought content normal.         Judgment: Judgment normal.               Robert Bloch, MD      Portions of the record may have been created with voice recognition software. Occasional wrong word or \"sound a like\" substitutions may have occurred due to the inherent limitations of voice recognition software. Read the " chart carefully and recognize, using context, where substitutions have occurred.      Robert Bloch, MD  7/11/2024 11:18 AM  Sign when Signing Visit  Patient ID: Kahlil Jackson is a 73 y.o. male Date of Birth 1950       Chief Complaint   Patient presents with   • Consult       Allergies:  Patient has no known allergies.    Diagnosis:  1. Umbilical hernia without obstruction and without gangrene  -     Ambulatory Referral to General Surgery     Diagnosis ICD-10-CM Associated Orders   1. 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   • Other hyperlipidemia   • Hemoptysis   • Asthma   • Pneumomediastinum (HCC)   • Leukocytosis   • Lumbar radiculopathy     Past Medical History:   Diagnosis Date   • Asthma 1960   • Cancer (HCC) 03/2023   • Hyperlipidemia    • Hypothyroid    • Mass of left lung      Past Surgical History:   Procedure Laterality Date   • BUNIONECTOMY Left    • COLONOSCOPY     • EPIDURAL BLOCK INJECTION N/A 03/06/2024    Procedure: L5-S1 LUMBAR EPIDURAL STEROID INJECTION;  Surgeon: Tk Malhotra DO;  Location: [facility] MAIN OR;  Service: Pain Management    • EPIDURAL BLOCK INJECTION Bilateral 04/24/2024    Procedure: BILATERAL L5-S1 TRANSFORAMINAL EPIDURAL STEROID INJECTION;  Surgeon: Tk Malhotra DO;  Location: [facility] MAIN OR;  Service: Pain Management    • HERNIA REPAIR      approximately 30 years ago   • TONSILLECTOMY       Social History     Socioeconomic History   • Marital status: Single     Spouse name: None   • Number of children: None   • Years of education: None   • Highest education level: None   Occupational History   • None   Tobacco Use    • Smoking status: Former     Current packs/day: 0.00     Average packs/day: 1 pack/day for 30.0 years (30.0 ttl pk-yrs)     Types: Cigarettes     Start date: 1993     Quit date: 2023     Years since quittin.5   • Smokeless tobacco: Never   Vaping Use   • Vaping status: Never Used   Substance and Sexual Activity   • Alcohol use: Yes     Alcohol/week: 4.0 standard drinks of alcohol     Types: 2 Glasses of wine, 2 Standard drinks or equivalent per week   • Drug use: Never   • Sexual activity: Not Currently     Partners: Female     Birth control/protection: Condom Male   Other Topics Concern   • None   Social History Narrative   • None     Social Determinants of Health     Financial Resource Strain: Low Risk  (2023)    Received from [facility]    Overall Financial Resource Strain (CARDIA)    • Difficulty of Paying Living Expenses: Not hard at all   Food Insecurity: No Food Insecurity (2023)    Received from [facility] ([facility]), [facility] ([facility])    Hunger Vital Sign    • Worried About Running Out of Food in the Last Year: Never true    • Ran Out of Food in the Last Year: Never true   Transportation Needs: No Transportation Needs (2023)    Received from [facility] ([facility]), [facility] ([facility])    PRAPARE - Transportation    • Lack of Transportation (Medical): No    • Lack of Transportation (Non-Medical): No   Physical Activity: Not on file   Stress: Not on file   Social Connections: Not on file   Intimate Partner Violence: Not on file   Housing Stability: Low Risk  (2023)    Received from [facility] ([facility]), [facility] ([facility])    Housing Stability    • What is your living situation today?:  "I have a steady place to live    • Housing Stability: Not on file        Current Outpatient Medications:   •  atorvastatin (LIPITOR) 20 mg tablet, Take by mouth, Disp: , Rfl:   •  cholecalciferol (VITAMIN D3) 1,000 units tablet, Take 2,000 Units by mouth daily, Disp: , Rfl:   •  DULoxetine (CYMBALTA) 30 mg delayed release capsule, TAKE 1 CAPSULE BY MOUTH EVERY DAY, Disp: 90 capsule, Rfl: 1  •  levothyroxine 75 mcg tablet, Take 100 mcg by mouth daily, Disp: , Rfl:   •  pregabalin (LYRICA) 75 mg capsule, Take 1 capsule (75 mg total) by mouth 2 (two) times a day, Disp: 60 capsule, Rfl: 1  •  methocarbamol (ROBAXIN) 500 mg tablet, Take 1 tablet (500 mg total) by mouth 3 (three) times a day, Disp: 30 tablet, Rfl: 2  Family History   Problem Relation Age of Onset   • Lung cancer Mother    • Cancer Mother            • Cancer Father            • Thyroid disease unspecified Paternal Grandfather    • Diabetes Maternal Grandmother            • Cancer Paternal Grandmother               Review of Systems   Constitutional:         Covid 2-3 times, last time early   Had 3 shots   HENT:  Positive for hearing loss.         Taste diminished   Eyes:         Had cataract surgery   Respiratory:          S/p L lobectomy for ca   Smoked 45 yrs at 1 ppd  copd   Cardiovascular:         On a statin   Gastrointestinal:         Has gallstones  No colonoscopy lately   Endocrine:        Hypothyroid from Hashimoto's   Genitourinary: Negative.    Musculoskeletal:  Positive for back pain.   Skin: Negative.    Neurological: Negative.    Hematological: Negative.    Psychiatric/Behavioral: Negative.           Objective:  /70 (BP Location: Left arm, Patient Position: Sitting, Cuff Size: Standard)   Pulse 92   Temp (!) 96.8 °F (36 °C) (Tympanic)   Ht 5' 7\" (1.702 m)   Wt 77.3 kg (170 lb 6.4 oz)   SpO2 97%   BMI 26.69 kg/m²     Physical Exam        Wound 24 Back N/A (Active)       Wound 24 Back Bilateral " "(Active)                         Procedures               Robert Bloch, MD      Portions of the record may have been created with voice recognition software. Occasional wrong word or \"sound a like\" substitutions may have occurred due to the inherent limitations of voice recognition software. Read the chart carefully and recognize, using context, where substitutions have occurred.    "

## 2024-07-11 NOTE — H&P (VIEW-ONLY)
Patient ID: Kahlil Jackson is a 73 y.o. male Date of Birth 1950       Chief complaint umbilical hernia    Allergies:  Patient has no known allergies.    Diagnosis:  1. Umbilical hernia without obstruction and without gangrene  -     Ambulatory Referral to General Surgery     Diagnosis ICD-10-CM Associated Orders   1. Umbilical hernia without obstruction and without gangrene  K42.9 Ambulatory Referral to General Surgery             Subjective:   The patient is a 73-year-old male who has a umbilical hernia.  This is occasionally bothersome and at least 20 at the CAT scan he had a loop of bowel stuck in it.  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    Other hyperlipidemia    Hemoptysis    Asthma    Pneumomediastinum (HCC)    Leukocytosis    Lumbar radiculopathy     Past Medical History:   Diagnosis Date    Asthma 1960    Cancer (HCC) 03/2023    Hyperlipidemia     Hypothyroid     Mass of left lung      Past Surgical History:   Procedure Laterality Date    BUNIONECTOMY Left     COLONOSCOPY      EPIDURAL BLOCK INJECTION N/A 03/06/2024    Procedure: L5-S1 LUMBAR EPIDURAL STEROID INJECTION;  Surgeon: Tk Malhotra DO;  Location: [facility] MAIN OR;  Service: Pain Management     EPIDURAL BLOCK INJECTION Bilateral 04/24/2024    Procedure: BILATERAL L5-S1 TRANSFORAMINAL EPIDURAL STEROID INJECTION;  Surgeon: Tk Malhotra DO;  Location: [facility] MAIN OR;  Service: Pain Management     HERNIA REPAIR      approximately 30 years ago    TONSILLECTOMY       Social History     Socioeconomic History    Marital status: Single     Spouse name: None    Number of children: None    Years of education: None    Highest education level: None   Occupational History    None   Tobacco Use    Smoking status:  Former     Current packs/day: 0.00     Average packs/day: 1 pack/day for 30.0 years (30.0 ttl pk-yrs)     Types: Cigarettes     Start date: 1993     Quit date: 2023     Years since quittin.5    Smokeless tobacco: Never   Vaping Use    Vaping status: Never Used   Substance and Sexual Activity    Alcohol use: Yes     Alcohol/week: 4.0 standard drinks of alcohol     Types: 2 Glasses of wine, 2 Standard drinks or equivalent per week    Drug use: Never    Sexual activity: Not Currently     Partners: Female     Birth control/protection: Condom Male   Other Topics Concern    None   Social History Narrative    None     Social Determinants of Health     Financial Resource Strain: Low Risk  (2023)    Received from [facility] ([facility]    Overall Financial Resource Strain (CARDIA)     Difficulty of Paying Living Expenses: Not hard at all   Food Insecurity: No Food Insecurity (2023)    Received from [facility] ([facility]), [facility] ([facility]    Hunger Vital Sign     Worried About Running Out of Food in the Last Year: Never true     Ran Out of Food in the Last Year: Never true   Transportation Needs: No Transportation Needs (2023)    Received from [facility] ([facility]), [facility] ([facility]/)    PRAPARE - Transportation     Lack of Transportation (Medical): No     Lack of Transportation (Non-Medical): No   Physical Activity: Not on file   Stress: Not on file   Social Connections: Not on file   Intimate Partner Violence: Not on file   Housing Stability: Low Risk  (2023)    Received from [facility] ([facility]), [facility] ([facility]/)    Housing Stability     What is your living situation today?: I have a steady place to live      "Housing Stability: Not on file        Current Outpatient Medications:     atorvastatin (LIPITOR) 20 mg tablet, Take by mouth, Disp: , Rfl:     cholecalciferol (VITAMIN D3) 1,000 units tablet, Take 2,000 Units by mouth daily, Disp: , Rfl:     DULoxetine (CYMBALTA) 30 mg delayed release capsule, TAKE 1 CAPSULE BY MOUTH EVERY DAY, Disp: 90 capsule, Rfl: 1    levothyroxine 75 mcg tablet, Take 100 mcg by mouth daily, Disp: , Rfl:     pregabalin (LYRICA) 75 mg capsule, Take 1 capsule (75 mg total) by mouth 2 (two) times a day, Disp: 60 capsule, Rfl: 1    methocarbamol (ROBAXIN) 500 mg tablet, Take 1 tablet (500 mg total) by mouth 3 (three) times a day, Disp: 30 tablet, Rfl: 2  Family History   Problem Relation Age of Onset    Lung cancer Mother     Cancer Mother             Cancer Father             Thyroid disease unspecified Paternal Grandfather     Diabetes Maternal Grandmother             Cancer Paternal Grandmother               Review of Systems   Constitutional:         Covid 2-3 times, last time early   Had 3 shots   HENT:  Positive for hearing loss.         Taste diminished   Eyes:         Had cataract surgery   Respiratory:          S/p L lobectomy for ca   Smoked 30 yrs at 1 ppd  copd   Cardiovascular:         On a statin   Gastrointestinal:         Has gallstones  No colonoscopy lately   Endocrine:        Hypothyroid from Hashimoto's   Genitourinary: Negative.    Musculoskeletal:  Positive for back pain.   Skin: Negative.    Neurological: Negative.    Hematological: Negative.    Psychiatric/Behavioral: Negative.           Objective:  /70 (BP Location: Left arm, Patient Position: Sitting, Cuff Size: Standard)   Pulse 92   Temp (!) 96.8 °F (36 °C) (Tympanic)   Ht 5' 7\" (1.702 m)   Wt 77.3 kg (170 lb 6.4 oz)   SpO2 97%   BMI 26.69 kg/m²     Physical Exam  Vitals reviewed.   Constitutional:       Appearance: Normal appearance.   HENT:      Head: Normocephalic and atraumatic. " "  Eyes:      General: No scleral icterus.     Conjunctiva/sclera: Conjunctivae normal.   Cardiovascular:      Rate and Rhythm: Normal rate and regular rhythm.   Pulmonary:      Effort: Pulmonary effort is normal. No respiratory distress.      Breath sounds: No stridor. No wheezing, rhonchi or rales.      Comments: Some diminished breath sounds on the left consistent with the lobectomy  Abdominal:      General: There is no distension.      Palpations: Abdomen is soft. There is no mass.      Tenderness: There is no abdominal tenderness. There is no guarding.      Hernia: A hernia is present.      Comments: Visible, palpable and reducible umbilical hernia.  Defect is only about 2 cm   Musculoskeletal:         General: Normal range of motion.      Cervical back: Normal range of motion. No rigidity.   Skin:     Coloration: Skin is not jaundiced.   Neurological:      Mental Status: He is alert and oriented to person, place, and time.   Psychiatric:         Mood and Affect: Mood normal.         Behavior: Behavior normal.         Thought Content: Thought content normal.         Judgment: Judgment normal.               Robert Bloch, MD      Portions of the record may have been created with voice recognition software. Occasional wrong word or \"sound a like\" substitutions may have occurred due to the inherent limitations of voice recognition software. Read the chart carefully and recognize, using context, where substitutions have occurred.    "

## 2024-07-19 ENCOUNTER — NURSE TRIAGE (OUTPATIENT)
Age: 74
End: 2024-07-19

## 2024-07-19 NOTE — TELEPHONE ENCOUNTER
Patient of Dr. Bloch.  Patient calling regarding blood work for surgery.  Explained that surgical coordinator will set up blood work needed as well as set up date for surgery.  Patient states understanding.  Routed to clinical. Patient would like call back.

## 2024-07-22 NOTE — TELEPHONE ENCOUNTER
ODALIS for patient to call back. He was to call with a surgery date. No orders have been placed until a surgery date is set up.

## 2024-07-24 ENCOUNTER — APPOINTMENT (OUTPATIENT)
Dept: LAB | Facility: HOSPITAL | Age: 74
End: 2024-07-24
Payer: MEDICARE

## 2024-07-24 ENCOUNTER — APPOINTMENT (OUTPATIENT)
Dept: LAB | Facility: HOSPITAL | Age: 74
End: 2024-07-24
Attending: SURGERY
Payer: MEDICARE

## 2024-07-24 DIAGNOSIS — Z01.818 ENCOUNTER FOR PREADMISSION TESTING: ICD-10-CM

## 2024-07-24 PROCEDURE — 93005 ELECTROCARDIOGRAM TRACING: CPT

## 2024-07-26 ENCOUNTER — ANESTHESIA EVENT (OUTPATIENT)
Dept: PERIOP | Facility: HOSPITAL | Age: 74
End: 2024-07-26
Payer: MEDICARE

## 2024-07-26 LAB
ATRIAL RATE: 83 BPM
P AXIS: 67 DEGREES
PR INTERVAL: 158 MS
QRS AXIS: 18 DEGREES
QRSD INTERVAL: 86 MS
QT INTERVAL: 370 MS
QTC INTERVAL: 434 MS
T WAVE AXIS: 60 DEGREES
VENTRICULAR RATE: 83 BPM

## 2024-07-26 PROCEDURE — 93010 ELECTROCARDIOGRAM REPORT: CPT | Performed by: INTERNAL MEDICINE

## 2024-07-26 NOTE — PRE-PROCEDURE INSTRUCTIONS
Pre-Surgery Instructions:   Medication Instructions    atorvastatin (LIPITOR) 20 mg tablet Take night before surgery    cholecalciferol (VITAMIN D3) 1,000 units tablet Stop taking 7 days prior to surgery.    levothyroxine 75 mcg tablet Take day of surgery.    pregabalin (LYRICA) 75 mg capsule Take day of surgery.      Medication instructions for day surgery reviewed. Please use only a sip of water to take your instructed medications. Avoid all over the counter vitamins, supplements and NSAIDS for one week prior to surgery per anesthesia guidelines. Tylenol is ok to take as needed.     You will receive a call one business day prior to surgery with an arrival time and hospital directions. If your surgery is scheduled on a Monday, the hospital will be calling you on the Friday prior to your surgery. If you have not heard from anyone by 8pm, please call the hospital supervisor through the hospital  at [phone]. (Basile [phone] or Ray [phone]).    Do not eat or drink anything after midnight the night before your surgery, including candy, mints, lifesavers, or chewing gum. Do not drink alcohol 24hrs before your surgery. Try not to smoke at least 24hrs before your surgery.       Follow the pre surgery showering instructions as listed in the “My Surgical Experience Booklet” or otherwise provided by your surgeon's office. Do not use a blade to shave the surgical area 1 week before surgery. It is okay to use a clean electric clippers up to 24 hours before surgery. Do not apply any lotions, creams, including makeup, cologne, deodorant, or perfumes after showering on the day of your surgery. Do not use dry shampoo, hair spray, hair gel, or any type of hair products.     No contact lenses, eye make-up, or artificial eyelashes. Remove nail polish, including gel polish, and any artificial, gel, or acrylic nails if possible. Remove all jewelry including rings and body piercing jewelry.     Wear causal  clothing that is easy to take on and off. Consider your type of surgery.    Keep any valuables, jewelry, piercings at home. Please bring any specially ordered equipment (sling, braces) if indicated.    Arrange for a responsible person to drive you to and from the hospital on the day of your surgery. Please confirm the visitor policy for the day of your procedure when you receive your phone call with an arrival time.     Call the surgeon's office with any new illnesses, exposures, or additional questions prior to surgery.    Please reference your “My Surgical Experience Booklet” for additional information to prepare for your upcoming surgery.

## 2024-08-06 ENCOUNTER — HOSPITAL ENCOUNTER (OUTPATIENT)
Facility: HOSPITAL | Age: 74
Setting detail: OUTPATIENT SURGERY
Discharge: HOME | End: 2024-08-06
Attending: SURGERY | Admitting: SURGERY
Payer: MEDICARE

## 2024-08-06 ENCOUNTER — ANESTHESIA (OUTPATIENT)
Dept: PERIOP | Facility: HOSPITAL | Age: 74
End: 2024-08-06
Payer: MEDICARE

## 2024-08-06 VITALS
RESPIRATION RATE: 18 BRPM | DIASTOLIC BLOOD PRESSURE: 82 MMHG | SYSTOLIC BLOOD PRESSURE: 129 MMHG | BODY MASS INDEX: 26.23 KG/M2 | WEIGHT: 167.1 LBS | HEART RATE: 77 BPM | TEMPERATURE: 97.8 F | OXYGEN SATURATION: 97 % | HEIGHT: 67 IN

## 2024-08-06 DIAGNOSIS — K42.9 UMBILICAL HERNIA WITHOUT OBSTRUCTION AND WITHOUT GANGRENE: Primary | ICD-10-CM

## 2024-08-06 PROCEDURE — C1781 MESH (IMPLANTABLE): HCPCS | Performed by: SURGERY

## 2024-08-06 PROCEDURE — 49591 RPR AA HRN 1ST < 3 CM RDC: CPT | Performed by: PHYSICIAN ASSISTANT

## 2024-08-06 PROCEDURE — 49591 RPR AA HRN 1ST < 3 CM RDC: CPT | Performed by: SURGERY

## 2024-08-06 DEVICE — POLYPROPYLENE NONABSORBABLE SYNTHETIC SURGICAL MESH
Type: IMPLANTABLE DEVICE | Site: UMBILICAL | Status: FUNCTIONAL
Brand: PROLENE

## 2024-08-06 RX ORDER — MIDAZOLAM HYDROCHLORIDE 2 MG/2ML
INJECTION, SOLUTION INTRAMUSCULAR; INTRAVENOUS AS NEEDED
Status: DISCONTINUED | OUTPATIENT
Start: 2024-08-06 | End: 2024-08-06

## 2024-08-06 RX ORDER — LIDOCAINE HYDROCHLORIDE 10 MG/ML
INJECTION, SOLUTION EPIDURAL; INFILTRATION; INTRACAUDAL; PERINEURAL AS NEEDED
Status: DISCONTINUED | OUTPATIENT
Start: 2024-08-06 | End: 2024-08-06

## 2024-08-06 RX ORDER — BUPIVACAINE HYDROCHLORIDE 5 MG/ML
INJECTION, SOLUTION EPIDURAL; INTRACAUDAL AS NEEDED
Status: DISCONTINUED | OUTPATIENT
Start: 2024-08-06 | End: 2024-08-06 | Stop reason: HOSPADM

## 2024-08-06 RX ORDER — HYDROCODONE BITARTRATE AND ACETAMINOPHEN 5; 325 MG/1; MG/1
1 TABLET ORAL EVERY 6 HOURS PRN
Status: DISCONTINUED | OUTPATIENT
Start: 2024-08-06 | End: 2024-08-06 | Stop reason: HOSPADM

## 2024-08-06 RX ORDER — MAGNESIUM HYDROXIDE 1200 MG/15ML
LIQUID ORAL AS NEEDED
Status: DISCONTINUED | OUTPATIENT
Start: 2024-08-06 | End: 2024-08-06 | Stop reason: HOSPADM

## 2024-08-06 RX ORDER — OXYCODONE HYDROCHLORIDE 5 MG/1
5 TABLET ORAL EVERY 4 HOURS PRN
Qty: 10 TABLET | Refills: 0 | Status: SHIPPED | OUTPATIENT
Start: 2024-08-06

## 2024-08-06 RX ORDER — ONDANSETRON 2 MG/ML
4 INJECTION INTRAMUSCULAR; INTRAVENOUS EVERY 6 HOURS PRN
Status: DISCONTINUED | OUTPATIENT
Start: 2024-08-06 | End: 2024-08-06 | Stop reason: HOSPADM

## 2024-08-06 RX ORDER — SODIUM CHLORIDE, SODIUM LACTATE, POTASSIUM CHLORIDE, CALCIUM CHLORIDE 600; 310; 30; 20 MG/100ML; MG/100ML; MG/100ML; MG/100ML
INJECTION, SOLUTION INTRAVENOUS CONTINUOUS PRN
Status: DISCONTINUED | OUTPATIENT
Start: 2024-08-06 | End: 2024-08-06

## 2024-08-06 RX ORDER — FENTANYL CITRATE 50 UG/ML
INJECTION, SOLUTION INTRAMUSCULAR; INTRAVENOUS AS NEEDED
Status: DISCONTINUED | OUTPATIENT
Start: 2024-08-06 | End: 2024-08-06

## 2024-08-06 RX ORDER — CEFAZOLIN SODIUM 2 G/50ML
2000 SOLUTION INTRAVENOUS ONCE
Status: COMPLETED | OUTPATIENT
Start: 2024-08-06 | End: 2024-08-06

## 2024-08-06 RX ORDER — HYDROMORPHONE HCL/PF 1 MG/ML
0.5 SYRINGE (ML) INJECTION
Status: DISCONTINUED | OUTPATIENT
Start: 2024-08-06 | End: 2024-08-06 | Stop reason: HOSPADM

## 2024-08-06 RX ORDER — PROPOFOL 10 MG/ML
INJECTION, EMULSION INTRAVENOUS AS NEEDED
Status: DISCONTINUED | OUTPATIENT
Start: 2024-08-06 | End: 2024-08-06

## 2024-08-06 RX ORDER — ONDANSETRON 2 MG/ML
4 INJECTION INTRAMUSCULAR; INTRAVENOUS ONCE AS NEEDED
Status: DISCONTINUED | OUTPATIENT
Start: 2024-08-06 | End: 2024-08-06 | Stop reason: HOSPADM

## 2024-08-06 RX ORDER — FENTANYL CITRATE/PF 50 MCG/ML
25 SYRINGE (ML) INJECTION
Status: DISCONTINUED | OUTPATIENT
Start: 2024-08-06 | End: 2024-08-06 | Stop reason: HOSPADM

## 2024-08-06 RX ADMIN — HYDROCODONE BITARTRATE AND ACETAMINOPHEN 1 TABLET: 5; 325 TABLET ORAL at 14:02

## 2024-08-06 RX ADMIN — FENTANYL CITRATE 25 MCG: 50 INJECTION, SOLUTION INTRAMUSCULAR; INTRAVENOUS at 12:06

## 2024-08-06 RX ADMIN — FENTANYL CITRATE 25 MCG: 50 INJECTION INTRAMUSCULAR; INTRAVENOUS at 13:08

## 2024-08-06 RX ADMIN — SODIUM CHLORIDE, SODIUM LACTATE, POTASSIUM CHLORIDE, AND CALCIUM CHLORIDE: .6; .31; .03; .02 INJECTION, SOLUTION INTRAVENOUS at 12:40

## 2024-08-06 RX ADMIN — MIDAZOLAM HYDROCHLORIDE 2 MG: 1 INJECTION, SOLUTION INTRAMUSCULAR; INTRAVENOUS at 11:51

## 2024-08-06 RX ADMIN — FENTANYL CITRATE 25 MCG: 50 INJECTION INTRAMUSCULAR; INTRAVENOUS at 13:17

## 2024-08-06 RX ADMIN — FENTANYL CITRATE 25 MCG: 50 INJECTION, SOLUTION INTRAMUSCULAR; INTRAVENOUS at 12:02

## 2024-08-06 RX ADMIN — LIDOCAINE HYDROCHLORIDE 50 MG: 10 INJECTION, SOLUTION EPIDURAL; INFILTRATION; INTRACAUDAL at 11:56

## 2024-08-06 RX ADMIN — SODIUM CHLORIDE, SODIUM LACTATE, POTASSIUM CHLORIDE, AND CALCIUM CHLORIDE: .6; .31; .03; .02 INJECTION, SOLUTION INTRAVENOUS at 11:49

## 2024-08-06 RX ADMIN — CEFAZOLIN SODIUM 2000 MG: 2 SOLUTION INTRAVENOUS at 11:54

## 2024-08-06 RX ADMIN — PROPOFOL 150 MG: 10 INJECTION, EMULSION INTRAVENOUS at 11:56

## 2024-08-06 NOTE — INTERVAL H&P NOTE
H&P reviewed. After examining the patient I find no changes in the patients condition since the H&P had been written.    Vitals:    08/06/24 1125   BP: 132/80   Pulse: 91   Resp: 16   Temp: 97.6 °F (36.4 °C)   SpO2: 99%

## 2024-08-06 NOTE — OP NOTE
OPERATIVE REPORT  PATIENT NAME: Kahlil Jackson    :  1950  MRN: [id number]  Pt Location: [facility] OR ROOM 01    SURGERY DATE: 2024    Surgeons and Role:     * Robert Bloch, MD - Primary     * Ai Little PA-C - Assisting    Preop Diagnosis:  Umbilical hernia [K42.9]    Post-Op Diagnosis Codes:     * Umbilical hernia [K42.9]    Procedure(s):  UMBILICAL HERNIA REPAIR    Specimen(s):  * No specimens in log *    Estimated Blood Loss:   Minimal    Drains:  * No LDAs found *    Anesthesia Type:   General    Operative Indications:  Umbilical hernia [K42.9]      Operative Findings:  same  Prior to opening the fascia, or reducing the contents of the hernia, the hernia defect was measured. The defect measured 2 cm by 2 cm. This was repaired as described in the body of the report below.      Complications:   None    Procedure and Technique:  The patient was identified by me and placed in supine position upon the operating room table.  An LMA was placed by anesthesia and general anesthesia was started.  The patient's abdomen was prepped and draped in the normal surgical manner and a timeout was performed.  Local anesthesia was given in a field block fashion as well as in line for the proposed incision.  Infraumbilical incision extended a little bit at the 3 o'clock position laterally was now made with a knife.  This was carried down through subcutaneous tissue with a knife.  1 bleeding point had to be bovied.  I now bluntly dissected around the hernia.  I then dissected the skin off of the underlying sac.  The peritoneum was not entered.  I then dissected underneath the fascia for at least a centimeter to a centimeter and a half.  There was no bleeding.  An Ethicon hernia systems plug was cut to size and was placed.  This was placed such that the underlay portion open below the fascia and the overlie above the fascia.  5 sutures or 6 sutures which were used sutures of Prolene were now placed.  Overlying skin was sewn to  the mesh.  Closure was now interrupted Vicryl followed by Monocryl followed by Exofin.  There was no qualified resident to assist.  Accordingly, jayro NEWMAN was the first assistant   I was present for all critical portions of the procedure. and A physician assistant was required during the procedure for retraction, tissue handling, dissection and suturing.    Patient Disposition:  PACU         SIGNATURE: Robert Bloch, MD  DATE: August 6, 2024  TIME: 12:34 PM

## 2024-08-06 NOTE — ANESTHESIA PREPROCEDURE EVALUATION
Procedure:  UMBILICAL HERNIA REPAIR (Abdomen)    Relevant Problems   CARDIO   (+) Other hyperlipidemia      ENDO   (+) Hypothyroidism, adult      PULMONARY   (+) Asthma      Left upper lobectomy for neoplasm in Aug 2023    Physical Exam    Airway    Mallampati score: II  TM Distance: >3 FB  Neck ROM: full     Dental       Cardiovascular  Cardiovascular exam normal    Pulmonary  Pulmonary exam normal     Other Findings        Anesthesia Plan  ASA Score- 2     Anesthesia Type- general with ASA Monitors.         Additional Monitors:     Airway Plan:            Plan Factors-    Chart reviewed. EKG reviewed.  Existing labs reviewed. Patient summary reviewed.          Obstructive sleep apnea risk education given perioperatively.        Induction- intravenous.    Postoperative Plan-         Informed Consent- Anesthetic plan and risks discussed with patient.  I personally reviewed this patient with the CRNA. Discussed and agreed on the Anesthesia Plan with the CRNA..

## 2024-08-06 NOTE — DISCHARGE INSTR - AVS FIRST PAGE
Post-Operative Care Instructions              1. General: You will feel pulling sensations around the wound and/or aches and pains around the incisions. This is normal. Even minor surgery is a change in your body and this is your body’s way of reaction to it. If you have had abdominal surgery, it may help to support the incision with a small pillow or blanket for comfort when moving or coughing.    2. Wound care:    Bandage/Dressing - Make sure to remove the bandage in about 48 hours, unless instructed otherwise. You usually don't have to redress the wound after 24-48 hours, unless for comfort. Keep the incision clean and dry. Let air get to it. If the Steri-Strips fall off, just keep the wound clean.     Glue - Leave glue alone, it will fall off on its own, no need for an additional dressings    3. Water: You may shower over the wound, unless there are drain tubes left in place. Do not bathe or use a pool or hot tub until cleared by the physician. You may shower right over the staples, glue or Steri-Strips and rinse wound with soapy water but do not scrub incision pat dry when you are done.    4. Activity: You may go up and down stairs, walk as much as you are comfortable, but walk at least 3 times each day. If you have had abdominal or hernia surgery, do not lift anything heavier than 15 pounds for at least 4 weeks.    5. Diet: You may resume a regular diet. If you had a same-day surgery or overnight stay surgery, you may wish to eat lightly for a few days: soups, crackers, and sandwiches. You may resume a regular diet when ready.    6. Medications: Resume all of your previous medications, unless told otherwise by the doctor. Tylenol and ibuoprofen is always fine, unless you are taking any narcotic pain medication containing Tylenol (such as Percocet, Darvocet, Vicodin, or anything containing acetaminophen). Do not take Tylenol if you're taking these medications. You do not need to take the narcotic pain  medications unless you are having significant pain and discomfort.    7. Driving: He will need someone to drive you home on the day of surgery. Do not drive or make any important decisions while on narcotic pain medication or 24 hours and after anesthesia or sedation for surgery. Generally, you may drive when your off all narcotic pain medications, and you can turn in your seat comfortably to check your blind spot.     8. Upset Stomach: You may take Maalox, Tums, or similar items for an upset stomach. If your narcotic pain medication causes an upset stomach, do not take it on an empty stomach. Try taking it with at least some crackers or toast.     9. Constipation: Patients often experienced constipation after surgery. You may take over-the-counter medication for this, such as Metamucil, Senokot, Dulcolax, milk of magnesia, etc. You may take a suppository unless you have had anorectal surgery such as a procedure on your hemorrhoids. If you experience significant nausea or vomiting after abdominal surgery, call the office before trying any of these medications.    10. Call the office: If you are experiencing any of the following, fevers above 101.5°, significant nausea or vomiting, if the wound develops drainage and/or is excessive redness around the wound, or if you have significant diarrhea or other worsening symptoms.    11. Pain: You may be given a prescription for pain. This will be given to the hospital, the day of surgery.    12. Sexual Activity: You may resume sexual activity when you feel ready and comfortable and your incision is sealed and healed without apparent infection risk.

## 2024-08-06 NOTE — ANESTHESIA POSTPROCEDURE EVALUATION
Post-Op Assessment Note    CV Status:  Stable  Pain Score: 0    Pain management: adequate       Mental Status:  Alert and awake   Hydration Status:  Euvolemic   PONV Controlled:  Controlled   Airway Patency:  Patent  Two or more mitigation strategies used for obstructive sleep apnea   Post Op Vitals Reviewed: Yes    No anethesia notable event occurred.    Staff: CRNA               BP   127/77   Temp   97.2   Pulse 81   Resp 16   SpO2 99

## 2024-08-29 ENCOUNTER — OFFICE VISIT (OUTPATIENT)
Dept: SURGERY | Facility: CLINIC | Age: 74
End: 2024-08-29
Payer: MEDICARE

## 2024-08-29 VITALS
HEART RATE: 95 BPM | BODY MASS INDEX: 26.68 KG/M2 | TEMPERATURE: 97.9 F | HEIGHT: 67 IN | OXYGEN SATURATION: 98 % | WEIGHT: 170 LBS | DIASTOLIC BLOOD PRESSURE: 60 MMHG | SYSTOLIC BLOOD PRESSURE: 102 MMHG

## 2024-08-29 DIAGNOSIS — K42.9 UMBILICAL HERNIA WITHOUT OBSTRUCTION AND WITHOUT GANGRENE: Primary | ICD-10-CM

## 2024-08-29 PROCEDURE — 99212 OFFICE O/P EST SF 10 MIN: CPT | Performed by: SURGERY

## 2024-08-29 RX ORDER — LEVOTHYROXINE SODIUM 100 UG/1
100 TABLET ORAL DAILY
COMMUNITY
Start: 2024-08-01

## 2024-08-29 NOTE — LETTER
August 29, 2024     Roger Alex DO  [address] [id number]    Patient: Kahlil Jackson   YOB: 1950   Date of Visit: 8/29/2024       Dear Dr. Alex:    Thank you for referring Kahlil Jackson to me for evaluation. Below are my notes for this consultation.    If you have questions, please do not hesitate to call me. I look forward to following your patient along with you.         Sincerely,        Robert Bloch, MD        CC: No Recipients

## 2024-08-29 NOTE — PROGRESS NOTES
The patient is 3 weeks or so out from an open repair of an umbilical hernia.  He complains of some swelling and hardness at the site.  On examination his wound is healing well.  You can actually feel the mesh circumferentially and it is intact.  He is got a tiny bit of fluid on top of the mesh and I told him to leave it alone

## 2024-10-08 DIAGNOSIS — M48.062 SPINAL STENOSIS OF LUMBAR REGION WITH NEUROGENIC CLAUDICATION: ICD-10-CM

## 2024-10-14 RX ORDER — PREGABALIN 75 MG/1
75 CAPSULE ORAL 2 TIMES DAILY
Qty: 60 CAPSULE | Refills: 1 | Status: SHIPPED | OUTPATIENT
Start: 2024-10-14

## 2024-11-08 ENCOUNTER — OFFICE VISIT (OUTPATIENT)
Dept: PAIN MEDICINE | Facility: CLINIC | Age: 74
End: 2024-11-08
Payer: MEDICARE

## 2024-11-08 VITALS
RESPIRATION RATE: 16 BRPM | DIASTOLIC BLOOD PRESSURE: 80 MMHG | BODY MASS INDEX: 27 KG/M2 | WEIGHT: 172 LBS | HEART RATE: 95 BPM | HEIGHT: 67 IN | SYSTOLIC BLOOD PRESSURE: 120 MMHG

## 2024-11-08 DIAGNOSIS — M48.062 SPINAL STENOSIS OF LUMBAR REGION WITH NEUROGENIC CLAUDICATION: ICD-10-CM

## 2024-11-08 DIAGNOSIS — M51.16 INTERVERTEBRAL DISC DISORDER WITH RADICULOPATHY OF LUMBAR REGION: Primary | ICD-10-CM

## 2024-11-08 DIAGNOSIS — G89.4 CHRONIC PAIN SYNDROME: ICD-10-CM

## 2024-11-08 PROCEDURE — 99214 OFFICE O/P EST MOD 30 MIN: CPT

## 2024-11-08 RX ORDER — PREGABALIN 100 MG/1
100 CAPSULE ORAL 2 TIMES DAILY
Qty: 60 CAPSULE | Refills: 5 | Status: SHIPPED | OUTPATIENT
Start: 2024-11-08

## 2024-11-08 NOTE — PROGRESS NOTES
Assessment:  1. Intervertebral disc disorder with radiculopathy of lumbar region    2. Chronic pain syndrome    3. Spinal stenosis of lumbar region with neurogenic claudication        Plan:  ] The patient is a 74-year-old male with a history of chronic pain secondary to low back pain, lumbar intervertebral disc disorder with radiculopathy and lumbar spinal stenosis with neurogenic claudication who presents to the office with ongoing bilateral low back pain and pain that radiates into bilateral lower extremities and right knee pain that is unchanged since his last office visit.    At this time, I did instruct patient we can repeat a bilateral L5-S1 TFESI to help decrease inflammation and provide him relief.  Patient would like to proceed and will be scheduled.  Complete risks and benefits including bleeding, infection, tissue reaction, nerve injury and allergic reaction were discussed.  The approach was demonstrated using models and literature was provided.  Verbal and written consent was obtained.    Overall his pain continues to be managed with taking Lyrica, therefore I will continue him on this medication as prescribed, however I will increase the dose to 100 mg for better efficacy.  Refills were electronically sent to the patient's pharmacy.    My impressions and treatment recommendations were discussed in detail with the patient who verbalized understanding and had no further questions.  Discharge instructions were provided. I personally saw and examined the patient and I agree with the above discussed plan of care.    Orders Placed This Encounter   Procedures    [place] [facility] procedure     Dr Roscoe You Status:   Future     Standing Expiration Date:   11/8/2028     Order Specific Question:   Reason for Exam:     Answer:   Bilateral L5-S1 TFESI     Order Specific Question:   Anticoagulant hold needed?     Answer:   No     New Medications Ordered This Visit   Medications    pregabalin (LYRICA) 100 mg  capsule     Sig: Take 1 capsule (100 mg total) by mouth 2 (two) times a day     Dispense:  60 capsule     Refill:  5       History of Present Illness:  Kahlil Jackson is a 74 y.o. male with a history of chronic pain secondary to low back pain, lumbar intervertebral disc disorder with radiculopathy and lumbar spinal stenosis with neurogenic claudication.  He was last seen on 4/24/2024 where he underwent a bilateral L5-S1 TFESI which provided him greater than 50% relief of his pain.  He presents to the office with ongoing bilateral low back pain and pain that radiates into bilateral lower extremities and right knee pain.    He states his pain is the same since the last office visit and intermittent.  He states his pain is worse with standing and does ease when he sits down.  He rates quality of his pain as dull/aching, throbbing, shooting, pins/needles and is currently rating his pain a 6-7/10 on a numeric scale.    Current pain medications include Advil as needed and Lyrica 75 mg twice a day which is providing him 20% relief of his pain without side effects.    I have personally reviewed and/or updated the patient's past medical history, past surgical history, family history, social history, current medications, allergies, and vital signs today.     Review of Systems   Respiratory:  Positive for shortness of breath.    Cardiovascular:  Negative for chest pain.   Gastrointestinal:  Negative for constipation, diarrhea, nausea and vomiting.   Musculoskeletal:  Positive for back pain, gait problem and joint swelling. Negative for arthralgias and myalgias.        Bilateral leg pain  Right knee pain  Decreased range of motion   Skin:  Negative for rash.   Neurological:  Negative for dizziness, seizures and weakness.   All other systems reviewed and are negative.      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    Other  hyperlipidemia    Hemoptysis    Asthma    Pneumomediastinum (HCC)    Leukocytosis    Lumbar radiculopathy       Past Medical History:   Diagnosis Date    Asthma 1960    Cancer (HCC) 2023    Hyperlipidemia     Hypothyroid     Mass of left lung        Past Surgical History:   Procedure Laterality Date    BUNIONECTOMY Left     COLONOSCOPY      EPIDURAL BLOCK INJECTION N/A 2024    Procedure: L5-S1 LUMBAR EPIDURAL STEROID INJECTION;  Surgeon: Tk Malhotra DO;  Location: [facility] MAIN OR;  Service: Pain Management     EPIDURAL BLOCK INJECTION Bilateral 2024    Procedure: BILATERAL L5-S1 TRANSFORAMINAL EPIDURAL STEROID INJECTION;  Surgeon: Tk Malhotra DO;  Location: [facility] MAIN OR;  Service: Pain Management     HERNIA REPAIR      approximately 30 years ago    AK RPR AA HERNIA 1ST < 3 CM REDUCIBLE N/A 2024    Procedure: UMBILICAL HERNIA REPAIR;  Surgeon: Robert Bloch, MD;  Location:  MAIN OR;  Service: General    TONSILLECTOMY         Family History   Problem Relation Age of Onset    Lung cancer Mother     Cancer Mother             Cancer Father             Thyroid disease unspecified Paternal Grandfather     Diabetes Maternal Grandmother             Cancer Paternal Grandmother                Social History     Occupational History    Not on file   Tobacco Use    Smoking status: Former     Current packs/day: 0.00     Average packs/day: 1 pack/day for 30.0 years (30.0 ttl pk-yrs)     Types: Cigarettes     Start date: 1993     Quit date: 2023     Years since quittin.8    Smokeless tobacco: Never   Vaping Use    Vaping status: Never Used   Substance and Sexual Activity    Alcohol use: Yes     Alcohol/week: 4.0 standard drinks of alcohol     Types: 2 Glasses of wine, 2 Standard drinks or equivalent per week    Drug use: Never    Sexual activity: Not Currently     Partners: Female     Birth control/protection: Condom Male       Current Outpatient Medications on File  "Prior to Visit   Medication Sig    atorvastatin (LIPITOR) 20 mg tablet Take by mouth    cholecalciferol (VITAMIN D3) 1,000 units tablet Take 2,000 Units by mouth daily    levothyroxine 100 mcg tablet Take 100 mcg by mouth daily    [DISCONTINUED] pregabalin (LYRICA) 75 mg capsule TAKE 1 CAPSULE BY MOUTH TWICE A DAY    oxyCODONE (ROXICODONE) 5 immediate release tablet Take 1 tablet (5 mg total) by mouth every 4 (four) hours as needed for moderate pain for up to 10 doses Max Daily Amount: 30 mg (Patient not taking: Reported on 8/29/2024)     No current facility-administered medications on file prior to visit.       No Known Allergies    Physical Exam:    /80   Pulse 95   Resp 16   Ht 5' 7\" (1.702 m)   Wt 78 kg (172 lb)   BMI 26.94 kg/m²     Constitutional:normal, well developed, well nourished, alert, in no distress and non-toxic and no overt pain behavior.  Eyes:anicteric  HEENT:grossly intact  Neck:supple, symmetric, trachea midline and no masses   Pulmonary:even and unlabored  Cardiovascular:No edema or pitting edema present  Skin:Normal without rashes or lesions and well hydrated  Psychiatric:Mood and affect appropriate  Neurologic:Cranial Nerves II-XII grossly intact  Musculoskeletal:antalgic and ambulates with cane    Imaging    "

## 2024-12-03 ENCOUNTER — TELEPHONE (OUTPATIENT)
Dept: PAIN MEDICINE | Facility: CLINIC | Age: 74
End: 2024-12-03

## 2024-12-04 ENCOUNTER — HOSPITAL ENCOUNTER (OUTPATIENT)
Dept: RADIOLOGY | Facility: HOSPITAL | Age: 74
Discharge: HOME | End: 2024-12-04
Payer: MEDICARE

## 2024-12-04 VITALS
RESPIRATION RATE: 17 BRPM | DIASTOLIC BLOOD PRESSURE: 88 MMHG | HEART RATE: 81 BPM | OXYGEN SATURATION: 97 % | SYSTOLIC BLOOD PRESSURE: 128 MMHG | TEMPERATURE: 96.8 F

## 2024-12-04 DIAGNOSIS — M51.16 INTERVERTEBRAL DISC DISORDER WITH RADICULOPATHY OF LUMBAR REGION: ICD-10-CM

## 2024-12-04 PROCEDURE — 64483 NJX AA&/STRD TFRM EPI L/S 1: CPT | Performed by: PHYSICAL MEDICINE & REHABILITATION

## 2024-12-04 RX ORDER — METHYLPREDNISOLONE ACETATE 40 MG/ML
40 INJECTION, SUSPENSION INTRA-ARTICULAR; INTRALESIONAL; INTRAMUSCULAR; PARENTERAL; SOFT TISSUE ONCE
Status: COMPLETED | OUTPATIENT
Start: 2024-12-04 | End: 2024-12-04

## 2024-12-04 RX ORDER — BUPIVACAINE HCL/PF 2.5 MG/ML
2 VIAL (ML) INJECTION ONCE
Status: COMPLETED | OUTPATIENT
Start: 2024-12-04 | End: 2024-12-04

## 2024-12-04 RX ADMIN — BUPIVACAINE HYDROCHLORIDE 2 ML: 2.5 INJECTION, SOLUTION EPIDURAL; INFILTRATION; INTRACAUDAL at 15:30

## 2024-12-04 RX ADMIN — IOHEXOL 2 ML: 300 INJECTION, SOLUTION INTRAVENOUS at 15:30

## 2024-12-04 RX ADMIN — METHYLPREDNISOLONE ACETATE 40 MG: 40 INJECTION, SUSPENSION INTRA-ARTICULAR; INTRALESIONAL; INTRAMUSCULAR; SOFT TISSUE at 15:30

## 2024-12-04 NOTE — H&P
History of Present Illness: The patient is a 74 y.o. male who presents with complaints of low back pain    Past Medical History:   Diagnosis Date    Asthma 1959    Cancer (HCC) 03/2023    Hyperlipidemia     Hypothyroid     Mass of left lung        Past Surgical History:   Procedure Laterality Date    BUNIONECTOMY Left     COLONOSCOPY      EPIDURAL BLOCK INJECTION N/A 03/06/2024    Procedure: L5-S1 LUMBAR EPIDURAL STEROID INJECTION;  Surgeon: Tk Malhotra DO;  Location: [facility] MAIN OR;  Service: Pain Management     EPIDURAL BLOCK INJECTION Bilateral 04/24/2024    Procedure: BILATERAL L5-S1 TRANSFORAMINAL EPIDURAL STEROID INJECTION;  Surgeon: Tk Malhotra DO;  Location: [facility] MAIN OR;  Service: Pain Management     HERNIA REPAIR      approximately 30 years ago    [place] RPR AA HERNIA 1ST < 3 CM REDUCIBLE N/A 8/6/2024    Procedure: UMBILICAL HERNIA REPAIR;  Surgeon: Robert Bloch, MD;  Location:  MAIN OR;  Service: General    TONSILLECTOMY           Current Outpatient Medications:     atorvastatin (LIPITOR) 20 mg tablet, Take by mouth, Disp: , Rfl:     cholecalciferol (VITAMIN D3) 1,000 units tablet, Take 2,000 Units by mouth daily, Disp: , Rfl:     levothyroxine 100 mcg tablet, Take 100 mcg by mouth daily, Disp: , Rfl:     oxyCODONE (ROXICODONE) 5 immediate release tablet, Take 1 tablet (5 mg total) by mouth every 4 (four) hours as needed for moderate pain for up to 10 doses Max Daily Amount: 30 mg (Patient not taking: Reported on 8/29/2024), Disp: 10 tablet, Rfl: 0    pregabalin (LYRICA) 100 mg capsule, Take 1 capsule (100 mg total) by mouth 2 (two) times a day, Disp: 60 capsule, Rfl: 5    Current Facility-Administered Medications:     bupivacaine (PF) (MARCAINE) 0.25 % injection 2 mL, 2 mL, Epidural, Once, Tk Malhotra DO    iohexol (OMNIPAQUE) 300 mg/mL injection 50 mL, 50 mL, Epidural, Once, Tk Malhotra DO    methylPREDNISolone acetate (DEPO-MEDROL) injection 40 mg, 40 mg, Perineural,  Once, Tk Malhotra, DO    No Known Allergies    Physical Exam: There were no vitals filed for this visit.  General: Awake, Alert, Oriented x 3, Mood and affect appropriate  Respiratory: Respirations even and unlabored  Cardiovascular: Peripheral pulses intact; no edema  Musculoskeletal Exam: Tenderness palpation bilateral lumbar paraspinals    ASA Score: 2         Assessment:   1. Intervertebral disc disorder with radiculopathy of lumbar region        Plan: Bilateral L5-S1 TFESI

## 2024-12-04 NOTE — DISCHARGE INSTR - LAB
Epidural Steroid Injection   WHAT YOU NEED TO KNOW:   An epidural steroid injection (ANGEL) is a procedure to inject steroid medicine into the epidural space. The epidural space is between your spinal cord and vertebrae. Steroids reduce inflammation and fluid buildup in your spine that may be causing pain. You may be given pain medicine along with the steroids.          ACTIVITY  Do not drive or operate machinery today.  No strenuous activity today - bending, lifting, etc.  You may resume normal activites starting tomorrow - start slowly and as tolerated.  You may shower today, but no tub baths or hot tubs.  You may have numbness for several hours from the local anesthetic. Please use caution and common sense, especially with weight-bearing activities.    CARE OF THE INJECTION SITE  If you have soreness or pain, apply ice to the area today (20 minutes on/20 minutes off).  Starting tomorrow, you may use warm, moist heat or ice if needed.  You may have an increase or change in your discomfort for 36-48 hours after your treatment.  Apply ice and continue with any pain medication you have been prescribed.  Notify the [facility] if you have any of the following: redness, drainage, swelling, headache, stiff neck or fever above 100°F.    SPECIAL INSTRUCTIONS  Our office will contact you in approximately 14 days for a progress report.    MEDICATIONS  Continue to take all routine medications.  Our office may have instructed you to hold some medications.    As no general anesthesia was used in today's procedure, you should not experience any side effects related to anesthesia.     If you are diabetic, the steroids used in today's injection may temporarily increase your blood sugar levels after the first few days after your injection. Please keep a close eye on your sugars and alert the doctor who manages your diabetes if your sugars are significantly high from your baseline or you are symptomatic.     If you have a  problem specifically related to your procedure, please call our office at [phone].  Problems not related to your procedure should be directed to your primary care physician.

## 2024-12-18 ENCOUNTER — TELEPHONE (OUTPATIENT)
Dept: PAIN MEDICINE | Facility: CLINIC | Age: 74
End: 2024-12-18

## 2024-12-20 DIAGNOSIS — M51.16 INTERVERTEBRAL DISC DISORDER WITH RADICULOPATHY OF LUMBAR REGION: Primary | ICD-10-CM

## 2024-12-20 RX ORDER — PREGABALIN 150 MG/1
150 CAPSULE ORAL 3 TIMES DAILY
Qty: 90 CAPSULE | Refills: 2 | Status: SHIPPED | OUTPATIENT
Start: 2024-12-20

## 2024-12-20 NOTE — TELEPHONE ENCOUNTER
Pt reports no improvement post inj   Pain level 6/10  Pt wants to know if his pregabalin can be increased

## 2024-12-20 NOTE — TELEPHONE ENCOUNTER
Lyrica increased to 150 mg 3 times a day  Please advise patient to start with 150 mg tablets twice a day for 5 days and then increase to 3 times a day  Thank you

## 2024-12-24 NOTE — TELEPHONE ENCOUNTER
Caller: barrett Guillory    Doctor: Roscoe    Reason for call: pt returning nurses call in regards to medication.     Call back#: [phone]

## (undated) DEVICE — PLASTIC ADHESIVE BANDAGE: Brand: CURITY

## (undated) DEVICE — TOWEL SET X-RAY

## (undated) DEVICE — CHLORAPREP APPLICATOR TINTED 10.5ML ONE-STEP

## (undated) DEVICE — SUT PROLENE 2-0 CT-2 30 IN 8411H

## (undated) DEVICE — TRAY EPIDURAL PERIFIX 20GA X 3.5IN TUOHY 8ML

## (undated) DEVICE — POOLE SUCTION HANDLE: Brand: CARDINAL HEALTH

## (undated) DEVICE — RADIOLOGY STERILE LABELS: Brand: CENTURION

## (undated) DEVICE — SYRINGE 5ML LL

## (undated) DEVICE — ADHESIVE SKIN HIGH VISCOSITY EXOFIN 1ML

## (undated) DEVICE — GLOVE SRG BIOGEL 7.5

## (undated) DEVICE — SKIN MARKER DUAL TIP WITH RULER CAP, FLEXIBLE RULER AND LABELS: Brand: DEVON

## (undated) DEVICE — WIPES BABY PAMPERS SENSITIVE 36/PK

## (undated) DEVICE — GLOVE SRG LF STRL BGL SKNSNS 7.5 PF

## (undated) DEVICE — SYRINGE 3ML LL

## (undated) DEVICE — BETHLEHEM UNIVERSAL MINOR GEN: Brand: CARDINAL HEALTH

## (undated) DEVICE — NEPTUNE E-SEP SMOKE EVACUATION PENCIL, COATED, 70MM BLADE, PUSH BUTTON SWITCH: Brand: NEPTUNE E-SEP

## (undated) DEVICE — INTENDED FOR TISSUE SEPARATION, AND OTHER PROCEDURES THAT REQUIRE A SHARP SURGICAL BLADE TO PUNCTURE OR CUT.: Brand: BARD-PARKER SAFETY BLADES SIZE 15, STERILE

## (undated) DEVICE — IV SET EXT SM BORE CARESITE 8IN

## (undated) DEVICE — SUT VICRYL 3-0 SH 27 IN J416H

## (undated) DEVICE — TRAY PAIN SUPPORT

## (undated) DEVICE — LIGHT HANDLE COVER SLEEVE DISP BLUE STELLAR

## (undated) DEVICE — EXOFIN PRECISION PEN HIGH VISCOSITY TOPICAL SKIN ADHESIVE: Brand: EXOFIN PRECISION PEN, 1G

## (undated) DEVICE — ANTIBACTERIAL UNDYED BRAIDED (POLYGLACTIN 910), SYNTHETIC ABSORBABLE SUTURE: Brand: COATED VICRYL

## (undated) DEVICE — SYRINGE EPI 8ML LUER SLIP LOSS OF RESISTANCE PLASTIC PERFIX

## (undated) DEVICE — CHLORAPREP HI-LITE 26ML ORANGE

## (undated) DEVICE — SUT MONOCRYL 4-0 PS-2 18 IN Y496G

## (undated) DEVICE — GLOVE INDICATOR PI UNDERGLOVE SZ 7.5 BLUE

## (undated) DEVICE — SYRINGE 10ML LL

## (undated) DEVICE — NEEDLE SPINAL 22G X 3.5 IN PLST HUB